# Patient Record
Sex: MALE | Race: WHITE | NOT HISPANIC OR LATINO | Employment: FULL TIME | URBAN - METROPOLITAN AREA
[De-identification: names, ages, dates, MRNs, and addresses within clinical notes are randomized per-mention and may not be internally consistent; named-entity substitution may affect disease eponyms.]

---

## 2017-02-07 ENCOUNTER — APPOINTMENT (EMERGENCY)
Dept: RADIOLOGY | Facility: HOSPITAL | Age: 27
End: 2017-02-07
Payer: COMMERCIAL

## 2017-02-07 ENCOUNTER — HOSPITAL ENCOUNTER (EMERGENCY)
Facility: HOSPITAL | Age: 27
Discharge: HOME | End: 2017-02-08
Admitting: EMERGENCY MEDICINE
Payer: COMMERCIAL

## 2017-02-07 ENCOUNTER — GENERIC CONVERSION - ENCOUNTER (OUTPATIENT)
Dept: OTHER | Facility: OTHER | Age: 27
End: 2017-02-07
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
## 2017-02-07 PROBLEM — M79.642 LEFT HAND PAIN: Status: ACTIVE | Noted: 2017-02-07

## 2017-02-07 PROBLEM — E16.2 HYPOGLYCEMIA: Status: ACTIVE | Noted: 2017-02-07

## 2017-02-07 PROBLEM — S20.219A CHEST WALL CONTUSION: Status: ACTIVE | Noted: 2017-02-07

## 2017-02-07 LAB
BASE EXCESS BLDA CALC-SCNC: 3 MMOL/L (ref -2–3)
CA-I BLD-SCNC: 1.1 MMOL/L (ref 1.12–1.32)
GLUCOSE SERPL-MCNC: 85 MG/DL (ref 65–140)
HCO3 BLDA-SCNC: 29.1 MMOL/L (ref 24–30)
HCT VFR BLD CALC: 40 % (ref 36.5–49.3)
HGB BLDA-MCNC: 13.6 G/DL (ref 12–17)
PCO2 BLD: 31 MMOL/L (ref 21–32)
PCO2 BLD: 50.8 MM HG (ref 42–50)
PH BLD: 7.37 [PH] (ref 7.3–7.4)
PO2 BLD: 20 MM HG (ref 35–45)
POTASSIUM BLD-SCNC: 3.4 MMOL/L (ref 3.5–5.3)
SAO2 % BLD FROM PO2: 30 % (ref 95–98)
SODIUM BLD-SCNC: 139 MMOL/L (ref 136–145)
SPECIMEN SOURCE: ABNORMAL

## 2017-02-07 PROCEDURE — 82947 ASSAY GLUCOSE BLOOD QUANT: CPT

## 2017-02-07 PROCEDURE — 71010 HB CHEST X-RAY 1 VIEW FRONTAL: CPT

## 2017-02-07 PROCEDURE — 82330 ASSAY OF CALCIUM: CPT

## 2017-02-07 PROCEDURE — 93005 ELECTROCARDIOGRAM TRACING: CPT | Performed by: EMERGENCY MEDICINE

## 2017-02-07 PROCEDURE — 73120 X-RAY EXAM OF HAND: CPT

## 2017-02-07 PROCEDURE — 36415 COLL VENOUS BLD VENIPUNCTURE: CPT | Performed by: FAMILY MEDICINE

## 2017-02-07 PROCEDURE — 70450 CT HEAD/BRAIN W/O DYE: CPT

## 2017-02-07 PROCEDURE — 85014 HEMATOCRIT: CPT

## 2017-02-07 PROCEDURE — 82803 BLOOD GASES ANY COMBINATION: CPT

## 2017-02-07 PROCEDURE — 90715 TDAP VACCINE 7 YRS/> IM: CPT | Performed by: EMERGENCY MEDICINE

## 2017-02-07 PROCEDURE — 84132 ASSAY OF SERUM POTASSIUM: CPT

## 2017-02-07 PROCEDURE — 84295 ASSAY OF SERUM SODIUM: CPT

## 2017-02-07 PROCEDURE — 96374 THER/PROPH/DIAG INJ IV PUSH: CPT

## 2017-02-07 RX ORDER — IBUPROFEN 600 MG/1
600 TABLET ORAL ONCE
Status: COMPLETED | OUTPATIENT
Start: 2017-02-08 | End: 2017-02-08

## 2017-02-07 RX ORDER — DEXTROSE MONOHYDRATE 25 G/50ML
25 INJECTION, SOLUTION INTRAVENOUS ONCE
Status: DISCONTINUED | OUTPATIENT
Start: 2017-02-07 | End: 2017-02-07

## 2017-02-07 RX ORDER — SODIUM CHLORIDE 9 MG/ML
INJECTION, SOLUTION INTRAVENOUS
Status: COMPLETED | OUTPATIENT
Start: 2017-02-07 | End: 2017-02-07

## 2017-02-07 RX ORDER — DEXTROSE MONOHYDRATE 25 G/50ML
INJECTION, SOLUTION INTRAVENOUS CODE/TRAUMA/SEDATION MEDICATION
Status: COMPLETED | OUTPATIENT
Start: 2017-02-07 | End: 2017-02-07

## 2017-02-07 RX ORDER — ONDANSETRON 2 MG/ML
4 INJECTION INTRAMUSCULAR; INTRAVENOUS ONCE
Status: COMPLETED | OUTPATIENT
Start: 2017-02-08 | End: 2017-02-08

## 2017-02-07 RX ORDER — INSULIN GLARGINE 100 [IU]/ML
26 INJECTION, SOLUTION SUBCUTANEOUS
COMMUNITY
End: 2017-05-30

## 2017-02-07 RX ADMIN — DEXTROSE MONOHYDRATE 25 ML: 500 INJECTION PARENTERAL at 22:49

## 2017-02-07 RX ADMIN — Medication 0.5 MG: at 23:25

## 2017-02-07 RX ADMIN — TETANUS TOXOID, REDUCED DIPHTHERIA TOXOID AND ACELLULAR PERTUSSIS VACCINE, ADSORBED 0.5 ML: 5; 2.5; 8; 8; 2.5 SUSPENSION INTRAMUSCULAR at 22:47

## 2017-02-07 RX ADMIN — IPRATROPIUM BROMIDE 0.5 MG: 0.5 SOLUTION RESPIRATORY (INHALATION) at 23:25

## 2017-02-07 RX ADMIN — SODIUM CHLORIDE 500 ML/HR: 0.9 INJECTION, SOLUTION INTRAVENOUS at 22:50

## 2017-02-07 RX ADMIN — ALBUTEROL SULFATE 5 MG: 2.5 SOLUTION RESPIRATORY (INHALATION) at 23:25

## 2017-02-08 ENCOUNTER — APPOINTMENT (EMERGENCY)
Dept: RADIOLOGY | Facility: HOSPITAL | Age: 27
End: 2017-02-08
Payer: COMMERCIAL

## 2017-02-08 VITALS
HEART RATE: 65 BPM | DIASTOLIC BLOOD PRESSURE: 68 MMHG | RESPIRATION RATE: 16 BRPM | TEMPERATURE: 98 F | OXYGEN SATURATION: 95 % | SYSTOLIC BLOOD PRESSURE: 130 MMHG

## 2017-02-08 LAB
ATRIAL RATE: 67 BPM
GLUCOSE SERPL-MCNC: 188 MG/DL (ref 65–140)
GLUCOSE SERPL-MCNC: 59 MG/DL (ref 65–140)
HOLD SPECIMEN: NORMAL
P AXIS: 31 DEGREES
PR INTERVAL: 124 MS
QRS AXIS: 89 DEGREES
QRSD INTERVAL: 90 MS
QT INTERVAL: 386 MS
QTC INTERVAL: 407 MS
T WAVE AXIS: 21 DEGREES
VENTRICULAR RATE: 67 BPM

## 2017-02-08 PROCEDURE — 73140 X-RAY EXAM OF FINGER(S): CPT

## 2017-02-08 PROCEDURE — 94640 AIRWAY INHALATION TREATMENT: CPT

## 2017-02-08 PROCEDURE — 96375 TX/PRO/DX INJ NEW DRUG ADDON: CPT

## 2017-02-08 PROCEDURE — 82948 REAGENT STRIP/BLOOD GLUCOSE: CPT

## 2017-02-08 PROCEDURE — 90471 IMMUNIZATION ADMIN: CPT

## 2017-02-08 PROCEDURE — 96376 TX/PRO/DX INJ SAME DRUG ADON: CPT

## 2017-02-08 PROCEDURE — 99285 EMERGENCY DEPT VISIT HI MDM: CPT

## 2017-02-08 RX ORDER — DEXTROSE MONOHYDRATE 25 G/50ML
25 INJECTION, SOLUTION INTRAVENOUS ONCE
Status: COMPLETED | OUTPATIENT
Start: 2017-02-08 | End: 2017-02-08

## 2017-02-08 RX ADMIN — ONDANSETRON 4 MG: 2 INJECTION INTRAMUSCULAR; INTRAVENOUS at 00:01

## 2017-02-08 RX ADMIN — DEXTROSE MONOHYDRATE 25 ML: 25 INJECTION, SOLUTION INTRAVENOUS at 00:39

## 2017-02-08 RX ADMIN — IBUPROFEN 600 MG: 600 TABLET ORAL at 00:26

## 2017-02-09 LAB — HOLD SPECIMEN: NORMAL

## 2017-05-30 ENCOUNTER — HOSPITAL ENCOUNTER (EMERGENCY)
Facility: HOSPITAL | Age: 27
Discharge: HOME | End: 2017-05-30
Attending: EMERGENCY MEDICINE | Admitting: EMERGENCY MEDICINE
Payer: COMMERCIAL

## 2017-05-30 VITALS
TEMPERATURE: 97.8 F | DIASTOLIC BLOOD PRESSURE: 59 MMHG | OXYGEN SATURATION: 97 % | HEART RATE: 71 BPM | RESPIRATION RATE: 16 BRPM | SYSTOLIC BLOOD PRESSURE: 129 MMHG

## 2017-05-30 DIAGNOSIS — R79.89 CREATININE ELEVATION: ICD-10-CM

## 2017-05-30 DIAGNOSIS — E16.2 HYPOGLYCEMIA: Primary | ICD-10-CM

## 2017-05-30 LAB
ANION GAP BLD CALC-SCNC: 19 MMOL/L (ref 4–13)
ANION GAP SERPL CALCULATED.3IONS-SCNC: 8 MMOL/L (ref 4–13)
BUN BLD-MCNC: 10 MG/DL (ref 5–25)
BUN SERPL-MCNC: 10 MG/DL (ref 5–25)
CA-I BLD-SCNC: 1.12 MMOL/L (ref 1.12–1.32)
CALCIUM SERPL-MCNC: 8.5 MG/DL (ref 8.3–10.1)
CHLORIDE BLD-SCNC: 97 MMOL/L (ref 100–108)
CHLORIDE SERPL-SCNC: 106 MMOL/L (ref 100–108)
CO2 SERPL-SCNC: 28 MMOL/L (ref 21–32)
CREAT BLD-MCNC: 1.4 MG/DL (ref 0.6–1.3)
CREAT SERPL-MCNC: 1.27 MG/DL (ref 0.6–1.3)
GFR SERPL CREATININE-BSD FRML MDRD: >60 ML/MIN/1.73SQ M
GFR SERPL CREATININE-BSD FRML MDRD: >60 ML/MIN/1.73SQ M
GLUCOSE SERPL-MCNC: 136 MG/DL (ref 65–140)
GLUCOSE SERPL-MCNC: 170 MG/DL (ref 65–140)
GLUCOSE SERPL-MCNC: 64 MG/DL (ref 65–140)
HCT VFR BLD CALC: 48 % (ref 36.5–49.3)
HGB BLDA-MCNC: 16.3 G/DL (ref 12–17)
PCO2 BLD: 27 MMOL/L (ref 21–32)
POTASSIUM BLD-SCNC: 3.2 MMOL/L (ref 3.5–5.3)
POTASSIUM SERPL-SCNC: 3.6 MMOL/L (ref 3.5–5.3)
SODIUM BLD-SCNC: 139 MMOL/L (ref 136–145)
SODIUM SERPL-SCNC: 142 MMOL/L (ref 136–145)
SPECIMEN SOURCE: ABNORMAL

## 2017-05-30 PROCEDURE — 85014 HEMATOCRIT: CPT

## 2017-05-30 PROCEDURE — 99285 EMERGENCY DEPT VISIT HI MDM: CPT

## 2017-05-30 PROCEDURE — 82948 REAGENT STRIP/BLOOD GLUCOSE: CPT

## 2017-05-30 PROCEDURE — 96360 HYDRATION IV INFUSION INIT: CPT

## 2017-05-30 PROCEDURE — 36415 COLL VENOUS BLD VENIPUNCTURE: CPT | Performed by: EMERGENCY MEDICINE

## 2017-05-30 PROCEDURE — 80048 BASIC METABOLIC PNL TOTAL CA: CPT | Performed by: EMERGENCY MEDICINE

## 2017-05-30 PROCEDURE — 80047 BASIC METABLC PNL IONIZED CA: CPT

## 2017-05-30 RX ORDER — DEXTROSE MONOHYDRATE 25 G/50ML
INJECTION, SOLUTION INTRAVENOUS
Status: COMPLETED
Start: 2017-05-30 | End: 2017-05-30

## 2017-05-30 RX ADMIN — SODIUM CHLORIDE 1000 ML: 0.9 INJECTION, SOLUTION INTRAVENOUS at 19:00
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
## 2021-01-06 ENCOUNTER — TELEMEDICINE (OUTPATIENT)
Dept: FAMILY MEDICINE CLINIC | Facility: CLINIC | Age: 31
End: 2021-01-06
Payer: COMMERCIAL

## 2021-01-06 VITALS — WEIGHT: 162.5 LBS | HEIGHT: 66 IN | BODY MASS INDEX: 26.12 KG/M2

## 2021-01-06 DIAGNOSIS — E10.9 TYPE 1 DIABETES MELLITUS WITHOUT COMPLICATION (HCC): Primary | ICD-10-CM

## 2021-01-06 DIAGNOSIS — F33.1 DEPRESSION OF INFANCY TO EARLY CHILDHOOD, MAJOR DEPRESSION, RECURRENT, MODERATE EPISODE (HCC): ICD-10-CM

## 2021-01-06 DIAGNOSIS — F33.1 MODERATE EPISODE OF RECURRENT MAJOR DEPRESSIVE DISORDER (HCC): ICD-10-CM

## 2021-01-06 PROCEDURE — 99203 OFFICE O/P NEW LOW 30 MIN: CPT | Performed by: FAMILY MEDICINE

## 2021-01-06 NOTE — PROGRESS NOTES
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, major depression, recurrent, moderate episode (HCC)        Moderate episode of recurrent major depressive disorder ([facility] )        Relevant Orders    Ambulatory referral to Psychiatry          BMI Counseling: Body mass index is 26 23 kg/m²  The BMI is above normal  Nutrition recommendations include decreasing portion sizes, encouraging healthy choices of fruits and vegetables, decreasing fast food intake, consuming healthier snacks, limiting drinks that contain sugar, moderation in carbohydrate intake and reducing intake of cholesterol  Exercise recommendations include moderate physical activity 150 minutes/week  No pharmacotherapy was ordered  Labs and referrals as above, discussed importance of medical compliance  Denies si/hi  F/u for annual physical  PHQ-9 Depression Screening    PHQ-9:   Frequency of the following problems over the past two weeks:      Little interest or pleasure in doing things: 2 - more than half the days  Feeling down, depressed, or hopeless: 2 - more than half the days  Trouble falling or staying asleep, or sleeping too much: 3 - nearly every day  Feeling tired or having little energy: 1 - several days  Poor appetite or overeatin - not at all  Feeling bad about yourself - or that you are a failure or have let yourself or your family down: 1 - several days  Trouble concentrating on things, such as reading the newspaper or watching television: 3 - nearly every day  Moving or speaking so slowly that other people could have noticed   Or the opposite - being so fidgety or restless that you have been moving around a lot more than usual: 1 - several days  Thoughts that you would be better off dead, or of hurting yourself in some way: 0 - not at all  PHQ-2 Score: 4  PHQ-9 Score: 13         Reason for visit is   Chief Complaint   Patient presents with    Virtual Brief Visit     needs a referral for pysch    Virtual Regular Visit        Encounter provider Myra Pelaez MD    Provider located at [address] [place] [address]  [address] [address] [phone]      Recent Visits  No visits were found meeting these conditions  Showing recent visits within past 7 days and meeting all other requirements     Today's Visits  Date Type Provider Dept   01/06/21 Telemedicine Myra Pelaez MD [address] [facility] today's visits and meeting all other requirements     Future Appointments  No visits were found meeting these conditions  Showing future appointments within next 150 days and meeting all other requirements        The patient was identified by name and date of birth  Luis Carlos Jeong was informed that this is a telemedicine visit and that the visit is being conducted through [address] [place] [address] and patient was informed that this is not a secure, HIPAA-compliant platform  He agrees to proceed     My office door was closed  No one else was in the room  He acknowledged consent and understanding of privacy and security of the video platform  The patient has agreed to participate and understands they can discontinue the visit at any time  Patient is aware this is a billable service  Marga Watts is a 31 y o  male     Patient weighed the co-pay For virtual visit to establish care:with new onset depression, was in remission for 15 years , type 1 diabetes- not seen a physician in 3 years , last seen 2017, control unknown, uses sliding scale insulin from [facility]   Has not seen ophthalm in 2 years, has strong family history of diabetes and members with blindness and requiring renal transplant  Past Medical History:   Diagnosis Date    Diabetes mellitus ([facility] )        History reviewed  No pertinent surgical history  Current Outpatient Medications   Medication Sig Dispense Refill    insulin regular (HumuLIN R,NovoLIN R) 100 units/mL injection Inject under the skin 3 (three) times a day before meals       No current facility-administered medications for this visit  No Known Allergies    Review of Systems   Constitutional: Negative for activity change, chills, diaphoresis, fatigue and fever  HENT: Negative for congestion, ear discharge, ear pain, mouth sores, nosebleeds, postnasal drip, rhinorrhea, sinus pressure, sinus pain, sore throat, tinnitus and voice change  Eyes: Negative for photophobia, pain, discharge, redness and visual disturbance  Respiratory: Negative for shortness of breath, wheezing and stridor  Cardiovascular: Negative for chest pain and palpitations  Gastrointestinal: Negative for abdominal pain, blood in stool, constipation, diarrhea, nausea and vomiting  Endocrine: Negative for cold intolerance and heat intolerance  Musculoskeletal: Negative for arthralgias, back pain, joint swelling and myalgias  Skin: Negative for pallor and rash  Neurological: Negative for dizziness, tremors, seizures, syncope, speech difficulty, weakness and headaches  Psychiatric/Behavioral: Negative for behavioral problems, decreased concentration, hallucinations, self-injury and suicidal ideas  The patient is not nervous/anxious  Depressed mood       Video Exam    Vitals:    01/06/21 0938   Weight: 73 7 kg (162 lb 8 oz)   Height: 5' 6" (1 676 m)       Physical Exam  Vitals signs and nursing note reviewed  Constitutional:       Appearance: He is well-developed  He is obese  HENT:      Head: Normocephalic and atraumatic        Right Ear: External ear normal       Left Ear: External ear normal    Eyes:      Conjunctiva/sclera: Conjunctivae normal    Neck:      Musculoskeletal: Normal range of motion  No neck rigidity or muscular tenderness  Pulmonary:      Effort: Pulmonary effort is normal  No respiratory distress  Abdominal:      Palpations: Abdomen is soft  Tenderness: There is no abdominal tenderness  Comments: Patient self-examined   Musculoskeletal:         General: No tenderness, deformity or signs of injury  Skin:     Coloration: Skin is not jaundiced or pale  Findings: No erythema  Neurological:      Mental Status: He is alert and oriented to person, place, and time  Mental status is at baseline  Psychiatric:         Mood and Affect: Mood is depressed  Behavior: Behavior normal          Thought Content: Thought content normal               VIRTUAL VISIT DISCLAIMER    Garrick Marjoriejunior acknowledges that he has consented to an online visit or consultation  He understands that the online visit is based solely on information provided by him, and that, in the absence of a face-to-face physical evaluation by the physician, the diagnosis he receives is both limited and provisional in terms of accuracy and completeness  This is not intended to replace a full medical face-to-face evaluation by the physician  Garrick Webb understands and accepts these terms

## 2021-01-13 ENCOUNTER — TELEPHONE (OUTPATIENT)
Dept: PSYCHIATRY | Facility: CLINIC | Age: 31
End: 2021-01-13

## 2021-01-13 NOTE — TELEPHONE ENCOUNTER
Behavorial Health Outpatient Intake Questions    Referred by: PCP    Please advised interviewee that they need to answer all questions truthfully to allow for best care and any misrepresentations of information may affect their ability to be seen at this clinic   => Was this discussed? Yes     Behavorial Health Outpatient Intake History -     Presenting Problem (in patient's words): Has been experiencing depression his whole life but states that within the last year it has gotten worse  Are there any developmental disabilities? ? If yes, can they speak to you on the phone? If they are too limited to speak to you on phone, refer out Yes ADHD    Are you taking any psychiatric medications? No    => If yes, who prescribes? If yes, are they injectable medications? Does the patient have a language barrier or hearing impairment? No    Have you been [facility] at [facility] by a therapist or a doctor in the past? If yes, who? No    Has the patient been hospitalized for mental health? No   If yes, how long ago was last hospitalization and where was it? Do you actively use alcohol or marijuana or illegal substances? If yes, what and how much - refer out to Drug and alcohol treatment if use is excessive or daily use of illegal substances There is a documented history of alcohol abuse confirmed by the patient  10 years ago    Do you have a community treatment team or ? No    Legal History-     Does the patient have any history of arrests, [facility] time, or DUIs? No  If Yes-  1) What types of charges? 2) When were they last incarcerated? 3) Are they currently on parole or probation? Minor Child-    Who has custody of the child? Is there a custody agreement? If there is a custody agreement remind parent that they must bring a copy to the first appt or they will not be seen       Intake Team, please check with provider before scheduling if flags come up such as:  - complex case  - legal history (other than DUI)  - communication barrier concerns are present  - if, in your judgment, this needs further review    ACCEPTED as a patient Yes  => Appointment Date: 02/24/2021 w/ Zachary Bee    Referred Elsewhere? No    Name of Insurance Co: [address] ID# [id number]  Insurance Phone #  If ins is primary or secondary  If patient is a minor, parents information such as Name, D  O B of guarantor

## 2021-01-20 ENCOUNTER — LAB (OUTPATIENT)
Dept: LAB | Facility: CLINIC | Age: 31
End: 2021-01-20
Payer: COMMERCIAL

## 2021-01-20 DIAGNOSIS — E10.9 TYPE 1 DIABETES MELLITUS WITHOUT COMPLICATION (HCC): ICD-10-CM

## 2021-01-20 LAB
ALBUMIN SERPL BCP-MCNC: 3.6 G/DL (ref 3.5–5)
ALP SERPL-CCNC: 124 U/L (ref 46–116)
ALT SERPL W P-5'-P-CCNC: 28 U/L (ref 12–78)
ANION GAP SERPL CALCULATED.3IONS-SCNC: 4 MMOL/L (ref 4–13)
AST SERPL W P-5'-P-CCNC: 26 U/L (ref 5–45)
BASOPHILS # BLD AUTO: 0.04 THOUSANDS/ΜL (ref 0–0.1)
BASOPHILS NFR BLD AUTO: 1 % (ref 0–1)
BILIRUB SERPL-MCNC: 0.76 MG/DL (ref 0.2–1)
BUN SERPL-MCNC: 12 MG/DL (ref 5–25)
CALCIUM SERPL-MCNC: 9.1 MG/DL (ref 8.3–10.1)
CHLORIDE SERPL-SCNC: 105 MMOL/L (ref 100–108)
CHOLEST SERPL-MCNC: 214 MG/DL (ref 50–200)
CO2 SERPL-SCNC: 27 MMOL/L (ref 21–32)
CREAT SERPL-MCNC: 1.15 MG/DL (ref 0.6–1.3)
CREAT UR-MCNC: 84.2 MG/DL
EOSINOPHIL # BLD AUTO: 0.27 THOUSAND/ΜL (ref 0–0.61)
EOSINOPHIL NFR BLD AUTO: 5 % (ref 0–6)
ERYTHROCYTE [DISTWIDTH] IN BLOOD BY AUTOMATED COUNT: 13.8 % (ref 11.6–15.1)
EST. AVERAGE GLUCOSE BLD GHB EST-MCNC: 171 MG/DL
GFR SERPL CREATININE-BSD FRML MDRD: 84 ML/MIN/1.73SQ M
GLUCOSE P FAST SERPL-MCNC: 300 MG/DL (ref 65–99)
HBA1C MFR BLD: 7.6 %
HCT VFR BLD AUTO: 42.5 % (ref 36.5–49.3)
HDLC SERPL-MCNC: 47 MG/DL
HGB BLD-MCNC: 13.7 G/DL (ref 12–17)
IMM GRANULOCYTES # BLD AUTO: 0.01 THOUSAND/UL (ref 0–0.2)
IMM GRANULOCYTES NFR BLD AUTO: 0 % (ref 0–2)
LDLC SERPL CALC-MCNC: 145 MG/DL (ref 0–100)
LYMPHOCYTES # BLD AUTO: 1.25 THOUSANDS/ΜL (ref 0.6–4.47)
LYMPHOCYTES NFR BLD AUTO: 21 % (ref 14–44)
MCH RBC QN AUTO: 29.8 PG (ref 26.8–34.3)
MCHC RBC AUTO-ENTMCNC: 32.2 G/DL (ref 31.4–37.4)
MCV RBC AUTO: 93 FL (ref 82–98)
MICROALBUMIN UR-MCNC: <5 MG/L (ref 0–20)
MICROALBUMIN/CREAT 24H UR: <6 MG/G CREATININE (ref 0–30)
MONOCYTES # BLD AUTO: 0.29 THOUSAND/ΜL (ref 0.17–1.22)
MONOCYTES NFR BLD AUTO: 5 % (ref 4–12)
NEUTROPHILS # BLD AUTO: 4.03 THOUSANDS/ΜL (ref 1.85–7.62)
NEUTS SEG NFR BLD AUTO: 68 % (ref 43–75)
NONHDLC SERPL-MCNC: 167 MG/DL
NRBC BLD AUTO-RTO: 0 /100 WBCS
PLATELET # BLD AUTO: 335 THOUSANDS/UL (ref 149–390)
PMV BLD AUTO: 10.1 FL (ref 8.9–12.7)
POTASSIUM SERPL-SCNC: 4.5 MMOL/L (ref 3.5–5.3)
PROT SERPL-MCNC: 7 G/DL (ref 6.4–8.2)
RBC # BLD AUTO: 4.59 MILLION/UL (ref 3.88–5.62)
SODIUM SERPL-SCNC: 136 MMOL/L (ref 136–145)
TRIGL SERPL-MCNC: 111 MG/DL
WBC # BLD AUTO: 5.89 THOUSAND/UL (ref 4.31–10.16)

## 2021-01-20 PROCEDURE — 83036 HEMOGLOBIN GLYCOSYLATED A1C: CPT

## 2021-01-20 PROCEDURE — 80053 COMPREHEN METABOLIC PANEL: CPT

## 2021-01-20 PROCEDURE — 36415 COLL VENOUS BLD VENIPUNCTURE: CPT

## 2021-01-20 PROCEDURE — 85025 COMPLETE CBC W/AUTO DIFF WBC: CPT

## 2021-01-20 PROCEDURE — 3051F HG A1C>EQUAL 7.0%<8.0%: CPT | Performed by: FAMILY MEDICINE

## 2021-01-20 PROCEDURE — 82570 ASSAY OF URINE CREATININE: CPT

## 2021-01-20 PROCEDURE — 80061 LIPID PANEL: CPT

## 2021-01-20 PROCEDURE — 82043 UR ALBUMIN QUANTITATIVE: CPT

## 2021-01-25 ENCOUNTER — TELEMEDICINE (OUTPATIENT)
Dept: FAMILY MEDICINE CLINIC | Facility: CLINIC | Age: 31
End: 2021-01-25
Payer: COMMERCIAL

## 2021-01-25 VITALS — BODY MASS INDEX: 28.93 KG/M2 | HEIGHT: 66 IN | WEIGHT: 180 LBS

## 2021-01-25 DIAGNOSIS — E78.2 MIXED HYPERLIPIDEMIA: Primary | ICD-10-CM

## 2021-01-25 DIAGNOSIS — E10.65 TYPE 1 DIABETES MELLITUS WITH HYPERGLYCEMIA (HCC): ICD-10-CM

## 2021-01-25 PROCEDURE — 1036F TOBACCO NON-USER: CPT | Performed by: FAMILY MEDICINE

## 2021-01-25 PROCEDURE — 3008F BODY MASS INDEX DOCD: CPT | Performed by: FAMILY MEDICINE

## 2021-01-25 PROCEDURE — 3725F SCREEN DEPRESSION PERFORMED: CPT | Performed by: FAMILY MEDICINE

## 2021-01-25 PROCEDURE — 99213 OFFICE O/P EST LOW 20 MIN: CPT | Performed by: FAMILY MEDICINE

## 2021-01-25 RX ORDER — ATORVASTATIN CALCIUM 10 MG/1
10 TABLET, FILM COATED ORAL DAILY
Qty: 30 TABLET | Refills: 2 | Status: SHIPPED | OUTPATIENT
Start: 2021-01-25

## 2021-01-25 NOTE — PROGRESS NOTES
Virtual Regular Visit      Assessment/Plan:    Problem List Items Addressed This Visit        Endocrine    Type 1 diabetes mellitus with hyperglycemia ([address] )       Other    Hyperlipidemia - Primary    Relevant Medications    atorvastatin (LIPITOR) 10 mg tablet        Discussed to start atorvastatin 10 mg , goal LDL<100 (140 CURRENTLY)  Increase Novolin N TO 12 u BEFORE breakfast and Novolin R sliding scale by 2 u based on his sliding scale  Current A1C- is 7 6, NEEDS tighter control goal A1C-7 0, discussed carb counting  He will follow up for annual physical in 2 months    Tobacco Cessation Counseling: Tobacco cessation counseling was not provided  The patient is sincerely urged to quit consumption of tobacco  He is not ready to quit tobacco        Reason for visit is   Chief Complaint   Patient presents with    Results     blood test results    Virtual Regular Visit        Encounter provider Fabiola Portillo MD    Provider located at [address] [place] [id number] [phone]      Recent Visits  No visits were found meeting these conditions  Showing recent visits within past 7 days and meeting all other requirements     Today's Visits  Date Type Provider Dept   01/25/21 Telemedicine Fabiola Portillo MD  [address] [facility] [facility] [address] today's visits and meeting all other requirements     Future Appointments  No visits were found meeting these conditions  Showing future appointments within next 150 days and meeting all other requirements        The patient was identified by name and date of birth  Glenny Dailey was informed that this is a telemedicine visit and that the visit is being conducted through [facility] and patient was informed that this is a secure, HIPAA-compliant platform  He agrees to proceed     My office door was closed  No one else was in the room    He acknowledged consent and understanding of privacy and security of the video platform  The patient has agreed to participate and understands they can discontinue the visit at any time  Patient is aware this is a billable service  Subjective  Tiana Zimmerman is a 31 y o  male       Type 1 diabetes- uncontrolled, chronic, needs to discuss labs  Hyperlipidemia-on labs, needs to discuss treatment       Past Medical History:   Diagnosis Date    Diabetes mellitus ([facility] )        History reviewed  No pertinent surgical history  Current Outpatient Medications   Medication Sig Dispense Refill    insulin regular (HumuLIN R,NovoLIN R) 100 units/mL injection Inject under the skin 3 (three) times a day before meals      atorvastatin (LIPITOR) 10 mg tablet Take 1 tablet (10 mg total) by mouth daily 30 tablet 2     No current facility-administered medications for this visit  No Known Allergies    Review of Systems   Constitutional: Negative for activity change, chills, diaphoresis, fatigue and fever  HENT: Negative for congestion, ear discharge, ear pain, mouth sores, nosebleeds, postnasal drip, rhinorrhea, sinus pressure, sinus pain, sore throat, tinnitus and voice change  Eyes: Negative for photophobia, pain, discharge, redness and visual disturbance  Respiratory: Negative for shortness of breath, wheezing and stridor  Cardiovascular: Negative for chest pain and palpitations  Gastrointestinal: Negative for abdominal pain, blood in stool, constipation, diarrhea, nausea and vomiting  Endocrine: Negative for cold intolerance and heat intolerance  Musculoskeletal: Negative for arthralgias, back pain, joint swelling and myalgias  Skin: Negative for pallor and rash  Neurological: Negative for dizziness, tremors, seizures, syncope, speech difficulty, weakness and headaches  Psychiatric/Behavioral: Negative for behavioral problems, decreased concentration, hallucinations and suicidal ideas  The patient is not nervous/anxious          Video Exam    Vitals: 01/25/21 0840   Weight: 81 6 kg (180 lb)   Height: 5' 6" (1 676 m)       Physical Exam  Vitals signs and nursing note reviewed  Constitutional:       Appearance: He is well-developed  HENT:      Head: Normocephalic and atraumatic  Right Ear: External ear normal       Left Ear: External ear normal    Eyes:      Conjunctiva/sclera: Conjunctivae normal    Neck:      Musculoskeletal: Normal range of motion  No neck rigidity or muscular tenderness  Pulmonary:      Effort: Pulmonary effort is normal  No respiratory distress  Abdominal:      Palpations: Abdomen is soft  Tenderness: There is no abdominal tenderness  Comments: Patient self-examined   Musculoskeletal:         General: No tenderness, deformity or signs of injury  Skin:     Coloration: Skin is not jaundiced or pale  Findings: No erythema  Neurological:      Mental Status: He is alert and oriented to person, place, and time  Mental status is at baseline  Psychiatric:         Behavior: Behavior normal           I spent 15 minutes directly with the patient during this visit      VIRTUAL VISIT DISCLAIMER    Norma Mancia acknowledges that he has consented to an online visit or consultation  He understands that the online visit is based solely on information provided by him, and that, in the absence of a face-to-face physical evaluation by the physician, the diagnosis he receives is both limited and provisional in terms of accuracy and completeness  This is not intended to replace a full medical face-to-face evaluation by the physician  Norma Mancia understands and accepts these terms

## 2021-03-08 ENCOUNTER — HOSPITAL ENCOUNTER (EMERGENCY)
Facility: HOSPITAL | Age: 31
Discharge: HOME | End: 2021-03-08
Attending: EMERGENCY MEDICINE | Admitting: EMERGENCY MEDICINE
Payer: COMMERCIAL

## 2021-03-08 VITALS
BODY MASS INDEX: 27 KG/M2 | HEIGHT: 66 IN | DIASTOLIC BLOOD PRESSURE: 68 MMHG | HEART RATE: 63 BPM | SYSTOLIC BLOOD PRESSURE: 117 MMHG | WEIGHT: 168 LBS | RESPIRATION RATE: 16 BRPM | TEMPERATURE: 97.4 F | OXYGEN SATURATION: 97 %

## 2021-03-08 DIAGNOSIS — K05.30 PERICORONITIS: Primary | ICD-10-CM

## 2021-03-08 PROCEDURE — 96372 THER/PROPH/DIAG INJ SC/IM: CPT

## 2021-03-08 PROCEDURE — 99284 EMERGENCY DEPT VISIT MOD MDM: CPT | Performed by: EMERGENCY MEDICINE

## 2021-03-08 PROCEDURE — 99283 EMERGENCY DEPT VISIT LOW MDM: CPT

## 2021-03-08 RX ORDER — KETOROLAC TROMETHAMINE 30 MG/ML
15 INJECTION, SOLUTION INTRAMUSCULAR; INTRAVENOUS ONCE
Status: DISCONTINUED | OUTPATIENT
Start: 2021-03-08 | End: 2021-03-08

## 2021-03-08 RX ORDER — PENICILLIN V POTASSIUM 500 MG/1
500 TABLET ORAL 3 TIMES DAILY
Qty: 15 TABLET | Refills: 0 | Status: SHIPPED | OUTPATIENT
Start: 2021-03-08 | End: 2021-03-13

## 2021-03-08 RX ORDER — PENICILLIN V POTASSIUM 250 MG/1
500 TABLET ORAL ONCE
Status: COMPLETED | OUTPATIENT
Start: 2021-03-08 | End: 2021-03-08

## 2021-03-08 RX ORDER — KETOROLAC TROMETHAMINE 30 MG/ML
15 INJECTION, SOLUTION INTRAMUSCULAR; INTRAVENOUS ONCE
Status: COMPLETED | OUTPATIENT
Start: 2021-03-08 | End: 2021-03-08

## 2021-03-08 RX ADMIN — PENICILLIN V POTASSIUM 500 MG: 250 TABLET, FILM COATED ORAL at 08:46

## 2021-03-08 RX ADMIN — KETOROLAC TROMETHAMINE 15 MG: 30 INJECTION, SOLUTION INTRAMUSCULAR at 08:46
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
## 2021-03-08 NOTE — ED NOTES
Dr Jayne Back at bedside for patient evaluation, mother at bedside        Unknown Tyson, RN  03/08/21 8385

## 2021-03-08 NOTE — DISCHARGE INSTRUCTIONS
Take 250 mg naproxen twice daily for the next 5 days  Stop taking the Tylenol  Start taking penicillin 3 times daily for the next 5 days  Follow up with the dental care clinic as discussed as soon as possible

## 2021-03-08 NOTE — Clinical Note
Adonis Mosqueda was seen and treated in our emergency department on 3/8/2021             none    Diagnosis:     Jesse Steen  may return to work on return date  He may return on this date: 03/09/2021         If you have any questions or concerns, please don't hesitate to call        Katerina Terry DO    ______________________________           _______________          _______________  Hospital Representative                              Date                                Time
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
## 2021-04-06 ENCOUNTER — TELEPHONE (OUTPATIENT)
Dept: PSYCHIATRY | Facility: CLINIC | Age: 31
End: 2021-04-06

## 2021-04-06 ENCOUNTER — TELEMEDICINE (OUTPATIENT)
Dept: PSYCHIATRY | Facility: CLINIC | Age: 31
End: 2021-04-06
Payer: COMMERCIAL

## 2021-04-06 DIAGNOSIS — F17.200 NICOTINE USE DISORDER: ICD-10-CM

## 2021-04-06 DIAGNOSIS — F33.1 MODERATE EPISODE OF RECURRENT MAJOR DEPRESSIVE DISORDER (HCC): ICD-10-CM

## 2021-04-06 DIAGNOSIS — F32.A DEPRESSIVE DISORDER: ICD-10-CM

## 2021-04-06 DIAGNOSIS — F41.1 GENERALIZED ANXIETY DISORDER: Primary | ICD-10-CM

## 2021-04-06 DIAGNOSIS — F19.10 POLYSUBSTANCE ABUSE (HCC): ICD-10-CM

## 2021-04-06 PROCEDURE — 90792 PSYCH DIAG EVAL W/MED SRVCS: CPT | Performed by: STUDENT IN AN ORGANIZED HEALTH CARE EDUCATION/TRAINING PROGRAM

## 2021-04-06 RX ORDER — DULOXETIN HYDROCHLORIDE 30 MG/1
30 CAPSULE, DELAYED RELEASE ORAL DAILY
Qty: 30 CAPSULE | Refills: 1 | Status: SHIPPED | OUTPATIENT
Start: 2021-04-06 | End: 2021-04-28 | Stop reason: SDUPTHER

## 2021-04-06 NOTE — BH TREATMENT PLAN
TREATMENT PLAN (Medication Management Only)        [address] [place] [address]    Name and Date of Birth:  Josh Moreno 31 y o  1990  Date of Treatment Plan: April 6, 2021  Diagnosis/Diagnoses:    1  Generalized anxiety disorder    2  Depressive disorder, unspecified     3  Nicotine use disorder    4  Hisotry of polysubstance abuse ([address] ) - in sustained remission    5  Moderate episode of recurrent major depressive disorder [facility])      Strengths/Personal Resources for Self-Care: supportive family, supportive friends, ability to adapt to life changes, ability to communicate needs, ability to communicate well, ability to listen, ability to reason, general fund of knowledge, good physical health, good understanding of illness, motivation for treatment, ability to negotiate basic needs, being resoureceful, self-reliance, sense of humor, stable employment, willingness to work on problems  Area/Areas of need (in own words): anxiety symptoms, mood swings - " I want to stop feeling so anxious and on edge"  1  Long Term Goal: alleviate anxiety, limit irritability, improve functionality   Target Date:6 months - 10/6/2021  Person/Persons responsible for completion of goal: Osvaldo Lucero  2  Short Term Objective (s) - How will we reach this goal?:   A  Provider new recommended medication/dosage changes and/or continue medication(s): continue current medications as prescribed  B  Attend medication management appointments regularly  C  Take psychiatric medications responsibly  D  Avoid use of ETOH or illict drugs   E   Lose weight  F  Prioritize mental health  Target Date:6 months - 10/6/2021  Person/Persons Responsible for Completion of Goal: Osvaldo Lucero  Progress Towards Goals: starting treatment  Treatment Modality: medication management every 2 months  Review due 180 days from date of this plan: 6 months - 10/6/2021  Expected length of service: ongoing treatment  My Physician/PA/NP and I have developed this plan together and I agree to work on the goals and objectives  I understand the treatment goals that were developed for my treatment  Treatment Plan completed with assistance and input from patient and verbal consent provided  Treatment plan was not signed at time of office visit secondary to COVID-19 social distancing guidelines

## 2021-04-06 NOTE — PSYCH
[address]    Name and Date of Birth:  Nora Maldonado 31 y o  1990 MRN: [id number]    Date of Visit: April 6, 2021    Reason for visit: Full psychiatric intake assessment for medication management     Virtual Visit Disclaimer: The patient was identified by name and date of birth  Nora Maldonado was informed that this is a telemedicine visit and that the visit is being conducted through [employer] and patient was informed that this is a secure, HIPAA-compliant platform  He agrees to proceed  My office door was closed  No one else was in the room  He acknowledged consent and understanding of privacy and security of the video platform  The patient has agreed to participate and understands they can discontinue the visit at any time  Patient is aware this is a billable service  HPI     Nora Maldonado is a 31 y o  male with a past psychiatric history significant for depression, anxiety, alcohol use disorder (in remission), nicotine use disorder, and previous episodes of polysubstance abuse (cocaine, benzodiazapine's, opiates) who presents to the [address] [place] [address] outpatient clinic for intake assessment  Blessing Brush presents as pleasant, cooperative, and appropriately engaging  His thoughts are organized, linear, and goal-directed and he completes psychiatric assessment without difficulty  Blessing Brush was referred to the clinic secondary to worsening anxiety and episodic bouts of depression, that are worse in the winter months  Blessing Brush endorses a longstanding history of polysubstance abuse that likely contributed to worsening mood symptomatology 1 decade ago  Blessing Brush is pleased to report today that he has been sober from alcohol, "pills", and cocaine for many years  He continues to use nicotine daily and cannabis 1-2x per week   In addition to polysubstance abuse, Blessing Brush has an extensive history of head trauma secondary to years of fighting in [facility]  He states that he has suffered 3 concussions "just this year"  This also has most likely contributed to mood symptomatology and anxiety  Acutely, Wali Chi states that his main concern is managing his anxiety  He admits to daily worry, excessive nervousness, and constantly feeling "tense" and on-edge  When overtly anxious, he endorses profound irritability, anger outbursts, and limited frustration tolerance  During these times, he is able to use mindfulness exercises learned in [facility] to decompress  These episodes happen approximately 1-2x per week  Wali Chi admits to being easily annoyed and restless  He finds it difficult to relax and concentrate at times, secondary to anxious ruminations  He denies panic symptomatology or any physical manifestations of anxiety  DAVI-7 score obtained during today's visit was 8  Wali Chi currently denies neurovegetative symptomatology suggestive of major depressive disorder or dysthymia  He does endorse a seasonal pattern to his dysphoria which has improved over the last 1 month  Wali Chi denies fragmented or non-restorative sleep  Wali Chi endorses robust appetite, baseline energy, and no impairment of motivation  Wali Chi reports adequate concentration/memory and denies new-onset forgetfulness or inattentiveness, when not overtly anxious or perturbed  Wali Chi does not experience daily crying spells or limited pleasure in activities previously found pleasurable  Wali Chi adamantly denies acute thoughts of suicide or self-harm  Wali Chi has no plans to harm others  There is no documented history of prior suicidal attempts but Wali Chi does admit to a suicidal gesture 10+ years ago in which he "tried to overdose on alcohol"  Wali Chi denies historical non-suicidal self injurious behavior   Wali Chi is future-oriented and demonstrates self preservation as evidenced by today's evaluation in which Wali Chi is seeking psychiatric intervention to improve overall mental health and outlook on life  Melba Aguilar denies a pervasive history of worthlessness, hopelessness, or guilt  PHQ-9 score obtained during today's visit was 10  Outside of use of cocaine, Melba Aguilar vehemently denies any acute or chronic history suggestive of an underlying affective (bipolar) organization  Melba Aguilar denies previous episodes of elevated/expansive mood, lengthy periods without sleep, grandiosity, or intense and prolonged irritability  As mentioned previous, he does experience bouts of anger and irritability, but these are not constant and are secondary to acute psychosocial stressors and unmanaged anxiety  Melba Aguilar denies atypical periods of increased goal-directed behavior, excessive spending, or sexual promiscuity  During today's evaluation, Melba Aguilar does not exhibit objective evidence of hypomania/leonid  Melba Aguilar is mostly organized in thought without flight of ideas or loosening of associations  Speech does not appear to be pressured or rapid and Melba Aguilar responds well to verbal redirecting  Melba Aguilar denies historical symptomatology suggestive of an underlying psychotic process  Melba Aguilar does not currently endorse acute perceptual disturbances such as A/V hallucinations, paranoia, referential ideation, or delusions  Melba Aguilar denies acute and chronic Schneiderian symptoms, including: thought-broadcasting, thought-insertion, thought-withdrawal or audible thoughts  During today's evaluation, Melba Aguilar does not exhibit objective evidence of barney psychosis as the patient does not appear internally preoccupied or easily distracted  Cains thoughts are organized, linear, and reality-based  Melba Aguilar denies historical symptomatology suggestive of PTSD, OCD, or disordered eating         Current Rating Scores:     Current PHQ-9   PHQ-9 Score (since 3/6/2021)     PHQ-9 Score  10        Current DAVI-7 is   DAVI-7 Flowsheet Screening      Most Recent Value   Over the last two weeks, how often have you been bothered by the following problems? Feeling nervous, anxious, or on edge  2   Not being able to stop or control worrying  0   Worrying too much about different things  0   Trouble relaxing   2   Being so restless that it's hard to sit still  1   Becoming easily annoyed or irritable   3   Feeling afraid as if something awful might happen  0   How difficult have these problems made it for you to do your work, take care of things at home, or get along with other people? Somewhat difficult   DAVI Score   8            Psychiatric Review Of Systems:    Sleep changes: yes, decreased  Appetite changes: no  Weight changes: yes, weight loss  (purposeful)  Energy/anergy: yes, decreased  Interest/pleasure/anhedonia: no  Somatic symptoms: no  Anxiety/panic: yes, worrying, worrying daily  Saira: no  Guilty/hopeless: no  Self injurious behavior/risky behavior: no  Suicidal ideation: no  Homicidal ideation: no  Auditory hallucinations: no  Visual hallucinations: no  Other hallucinations: no  Delusional thinking: no  Eating disorder history: no  Obsessive/compulsive symptoms: no    Review Of Systems:    Constitutional feeling tired, low energy, recent weight loss (40lbs in 7 months - purposeful lbs) and as noted in HPI   ENT dizziness   Cardiovascular negative   Respiratory negative   Gastrointestinal negative   Genitourinary negative   Musculoskeletal neck pain, hand pain, wrist pain, limb pain and muscle aches   Integumentary negative   Neurological decreased memory, dizziness and unstable gait   Endocrine negative   Other Symptoms none, all other systems are negative       Family Psychiatric History:     Family History   Problem Relation Age of Onset    Alcohol abuse Mother     Anxiety disorder Mother     Depression Mother     Anxiety disorder Brother     Depression Brother          Past Psychiatric History:     Inpatient psychiatric admissions: Denies  Prior outpatient psychiatric linkage: Denies  Past/current psychotherapy: Received therapy while in Rehab in 2010 - no current therapy linkage  History of suicidal attempts/gestures: 1x in 2010 when he "excessively drank" and hope he didn't awake   History of violence/aggressive behaviors: Denies  Psychotropic medication trials: "stimulant for ADHD"  Substance abuse inpatient/outpatient rehabilitation: 1x - 2010 at [facility] in [place]    Substance Abuse History:    Extensive history of ETOH, illict substance (cocaine, cannabis, xanax, opiates), and tobacco abuse  States that he now only uses cannabis (1-2x per week) and daily nicotine use  He has been sober from other substances for 5+ years  No past legal actions or arrests secondary to substance intoxication  The patient denies prior DWIs/DUIs  Lethaniel Carmen does not exhibit objective evidence of substance withdrawal during today's examination nor does Lethaniel Carmen appear under the influence of any psychoactive substance  Social History:    Developmental: Denies a history of milestone/developmental delay  Denies a history of in-utero exposure to toxins/illicit substances  There is no documented history of IEP or need for special education  Mother and father  when patient was in 2nd grade  Education: high school diploma/GED  Marital history: single  Living arrangement, social support: friend(s), has social supports at the [employer] where he works   Occupational History: Employed full time at [employer], trains individuals to fight MMA  Access to firearms: Denies direct access to weapons/firearms  Shyla Zimmerman has no history of arrests or violence with a deadly weapon  Traumatic History:     Abuse:none is reported  Other Traumatic Events: Numerous concussions ("at least 5 maybe 10") via kickboxing and exposure to MMA    Past Medical History:    Past Medical History:   Diagnosis Date    Diabetes mellitus ([facility] [address] )      No past medical history pertinent negatives    No past surgical history on file  No Known Allergies    History Review: The following portions of the patient's history were reviewed and updated as appropriate: allergies, current medications, past family history, past medical history, past social history, past surgical history and problem list     OBJECTIVE:    Vital signs in last 24 hours: There were no vitals filed for this visit      Mental Status Evaluation:    Appearance age appropriate, casually dressed, dressed appropriately, looks stated age   Behavior pleasant, cooperative, mildly anxious, fair eye contact   Speech normal rate, normal volume, normal pitch, fluent, clear   Mood dysphoric, anxious   Affect constricted   Thought Processes organized, logical, goal directed, normal rate of thoughts, normal abstract reasoning   Associations concrete associations   Thought Content no overt delusions   Perceptual Disturbances: no auditory hallucinations, no visual hallucinations   Abnormal Thoughts  Risk Potential Suicidal ideation - None  Homicidal ideation - None  Potential for aggression - No   Orientation oriented to person, place, time/date and situation   Memory recent and remote memory grossly intact   Consciousness alert and awake   Attention Span Concentration Span attention span and concentration are age appropriate   Intellect appears to be of average intelligence   Insight fair   Judgement fair   Muscle Strength and  Gait unable to assess today due to virtual visit   Motor Activity no abnormal movements   Language no difficulty naming common objects, no difficulty repeating a phrase   Fund of Knowledge adequate knowledge of current events  adequate fund of knowledge regarding past history  adequate fund of knowledge regarding vocabulary    Pain moderate   Pain Scale did not ask to rate       Laboratory Results: I have personally reviewed all pertinent laboratory/tests results    Recent Labs (last 2 months):   No visits with results within 2 Month(s) from this visit  Latest known visit with results is:   Lab on 01/20/2021   Component Date Value    WBC 01/20/2021 5 89     RBC 01/20/2021 4 59     Hemoglobin 01/20/2021 13 7     Hematocrit 01/20/2021 42 5     MCV 01/20/2021 93     MCH 01/20/2021 29 8     MCHC 01/20/2021 32 2     RDW 01/20/2021 13 8     MPV 01/20/2021 10 1     Platelets 01/62/3675 335     nRBC 01/20/2021 0     Neutrophils Relative 01/20/2021 68     Immat GRANS % 01/20/2021 0     Lymphocytes Relative 01/20/2021 21     Monocytes Relative 01/20/2021 5     Eosinophils Relative 01/20/2021 5     Basophils Relative 01/20/2021 1     Neutrophils Absolute 01/20/2021 4 03     Immature Grans Absolute 01/20/2021 0 01     Lymphocytes Absolute 01/20/2021 1 25     Monocytes Absolute 01/20/2021 0 29     Eosinophils Absolute 01/20/2021 0 27     Basophils Absolute 01/20/2021 0 04     Sodium 01/20/2021 136     Potassium 01/20/2021 4 5     Chloride 01/20/2021 105     CO2 01/20/2021 27     ANION GAP 01/20/2021 4     BUN 01/20/2021 12     Creatinine 01/20/2021 1 15     Glucose, Fasting 01/20/2021 300*    Calcium 01/20/2021 9 1     AST 01/20/2021 26     ALT 01/20/2021 28     Alkaline Phosphatase 01/20/2021 124*    Total Protein 01/20/2021 7 0     Albumin 01/20/2021 3 6     Total Bilirubin 01/20/2021 0 76     eGFR 01/20/2021 84     Hemoglobin A1C 01/20/2021 7 6*    EAG 01/20/2021 171     Cholesterol 01/20/2021 214*    Triglycerides 01/20/2021 111     HDL, Direct 01/20/2021 47     LDL Calculated 01/20/2021 145*    Non-HDL-Chol (CHOL-HDL) 01/20/2021 167     Creatinine, Ur 01/20/2021 84 2     Microalbum  ,U,Random 01/20/2021 <5 0     Microalb Creat Ratio 01/20/2021 <6        Suicide/Homicide Risk Assessment:    Risk of Harm to Self:  The following ratings are based on assessment at the time of the interview and review of records  Demographic risk factors include: , never , male  Historical Risk Factors include: history of depression, history of anxiety, history of substance use  Recent Specific Risk Factors include: current anxiety symptoms  Protective Factors: no current suicidal ideation, ability to adapt to change, access to mental health treatment, compliant with medications, compliant with mental health treatment, connection to community, contact with caregivers, cultural beliefs discouraging suicide, effective coping skills, effective decision-making skills, effective problem solving skills, having a desire to be alive, having a sense of purpose or meaning in life, healthy fear of risky behaviors and pain, medical compliance, opportunities to contribute to community, opportunities to participate in community, restricted access to lethal means, stable living environment, stable job, sense of determination, sense of importance of health and wellness, sense of personal control, sobriety, supportive family, supportive friends  Weapons: none  The following steps have been taken to ensure weapons are properly secured: not applicable  Based on today's assessment, Nithya Soliman presents the following risk of harm to self: low    Risk of Harm to Others: The following ratings are based on assessment at the time of the interview and review of records  Demographic Risk Factors include: male, under age 36  Historical Risk Factors include: none  Recent Specific Risk Factors include: none  Protective Factors: no current homicidal ideation  Weapons: none  The following steps have been taken to ensure weapons are properly secured: not applicable  Based on today's assessment, Nithya Soliman presents the following risk of harm to others: none    The following interventions are recommended: no intervention changes needed  Although patient's acute lethality risk is LOW, long-term/chronic lethality risk is mildly elevated given limited social supports, extensive history of polysubstance abuse, male sex, and prior suicidal gesture   However, at the current moment, Adriana Alford is future-oriented, forward-thinking, and demonstrates ability to act in a self-preserving manner as evidenced by volitionally presenting to the clinic today, seeking treatment  Additionally, Adriana Alford has refrained from abusing ETOH or illicit substances (aside from cannabis) for many years and personally requests not being prescribed Xanax or any other addictive agent, suggesting a will and desire to live  He lists family and career aspirations as protective factors against suicide  At this juncture, inpatient hospitalization is not currently warranted  To mitigate future risk, patient should adhere to treatment recommendations, avoid alcohol/illicit substance use, utilize community-based resources and familiar support, and prioritize mental health treatment  Assessment/Plan:       Gabi Bob is a 31 y o  male with a past psychiatric history significant for depression, anxiety, alcohol use disorder (in remission), nicotine use disorder, and previous episodes of polysubstance abuse (cocaine, benzodiazapine's, opiates) who presents to the [address] [place] [address] E outpatient clinic for intake assessment  Adriana Alford was referred to the clinic secondary to worsening anxiety and episodic bouts of depression, that are worse in the winter months  Adriana Alford endorses a longstanding history of polysubstance abuse that likely contributed to worsening mood symptomatology 1 decade ago  Adriana Alford is pleased to report today that he has been sober from alcohol, "pills", and cocaine for many years  He continues to use nicotine daily and cannabis 1-2x per week  In addition to polysubstance abuse, Adriana Alford has an extensive history of head trauma secondary to years of fighting in [address] [place] [address] Avenue  He states that he has suffered 3 concussions "just this year"  This also has most likely contributed to mood symptomatology and anxiety  Acutely, Adriana Alford states that his main concern is managing his anxiety   He admits to daily worry, excessive nervousness, and constantly feeling "tense" and on-edge  When overtly anxious, he endorses profound irritability, anger outbursts, and limited frustration tolerance  During these times, he is able to use mindfulness exercises learned in [facility] to decompress  These episodes happen approximately 1-2x per week  Yair Mckoy admits to being easily annoyed and restless  He finds it difficult to relax and concentrate at times, secondary to anxious ruminations  He denies panic symptomatology or any physical manifestations of anxiety  DAVI-7 score obtained during today's visit was 8  Yair Mckoy currently denies neurovegetative symptomatology suggestive of major depressive disorder or dysthymia  He does endorse a seasonal pattern to his dysphoria which has improved over the last 1 month  Yair Mckoy adamantly denies acute thoughts of suicide or self-harm  Yair Mckoy has no plans to harm others  There is no documented history of prior suicidal attempts but Yair Mckoy does admit to a suicidal gesture 10+ years ago in which he "tried to overdose on alcohol"  PHQ-9 score obtained during today's visit was 10  Outside of use of cocaine, Yair Mckoy vehemently denies any acute or chronic history suggestive of an underlying affective (bipolar) organization  Yair Mckoy denies previous episodes of elevated/expansive mood, lengthy periods without sleep, grandiosity, or intense and prolonged irritability  As mentioned previous, he does experience bouts of anger and irritability, but these are not constant and are secondary to acute psychosocial stressors and unmanaged anxiety  Yair Mckoy denies historical symptomatology suggestive of an underlying psychotic process  Psychopharmacologically, I spoke at length with Yair Mckoy about medication options available to treat anxiety  He was also educated on ways to mitigate risk of further head trauma as he continues to engage in [address] [place] [address] activities   His history of head injury and numerous mTBIs likely contributes to his poor frustration tolerance, disinhibit, and anger outbursts  Acutely, given ongoing pain associated with MMA fighting and anxiety, will start Cymbalta given its anxiolytic benefits and pain management properties  Risks/benefits/alternativies to treatment discussed, including a myriad of potential adverse medication side effects, to which Silva Ni voiced understanding and consented fully to treatment  DSM-V Diagnoses:     1 ) Generalized Anxiety Disorder  2 ) Unspecified Depressive Disorder  3 ) Nicotine Use Disorder  4   Hx of Polysubstance Abuse (cocaine, benzodiazapine, opiates)      Treatment Recommendations/Precautions:      1 ) Generalized Anxiety Disorder  - Start Cymbalta 30mg Daily to target anxiety, depressive symptomatology, and 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  - Not interested in weekly psychotherapy at the moment  - Psychoeducation provided regarding the importance of exercise and healthy dietary choices and their impact on mood, energy, and motivation  - Counseled to avoid ETOH, illict substances, and nicotine secondary to the detrimental effects of these substances on mental and physical health  - Encouraged to engage in non-verbal forms of therapy such as art therapy, music therapy, and mindfulness  - Discussed the bio-psycho-social model to treatment and therapeutic exercises/interventions were attempted to cognitively restructure thoughts    2 ) Unspecified Depressive Disorder  - Start Cymbalta 30mg Daily to target anxiety, depressive symptomatology, and 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  - Not interested in weekly psychotherapy at the moment  - Psychoeducation provided regarding the importance of exercise and healthy dietary choices and their impact on mood, energy, and motivation  - Counseled to avoid ETOH, illict substances, and nicotine secondary to the detrimental effects of these substances on mental and physical health  - Encouraged to engage in non-verbal forms of therapy such as art therapy, music therapy, and mindfulness  - Discussed the bio-psycho-social model to treatment and therapeutic exercises/interventions were attempted to cognitively restructure thoughts    3 ) Nicotine Use Disorder  - Counseled to avoid ETOH, illict substances, and nicotine secondary to the detrimental effects of these substances on mental and physical health    4  Hx of Polysubstance Abuse (cocaine, benzodiazapine, opiates)  - Sobriety for 5+ years, attended rehab in 2010  - Counseled to avoid ETOH, illict substances, and nicotine secondary to the detrimental effects of these substances on mental and physical health      Medication management every 8 weeks  Aware of need to follow up with family physician for medical issues  Aware of 24 hour and weekend coverage for urgent situations accessed by calling [facility] main practice number    Medications Risks/Benefits:      Risks, Benefits And Possible Side Effects Of Medications:    Risks, benefits, and possible side effects of medications explained to [facility] including risk of suicidality and serotonin syndrome related to treatment with antidepressants  He verbalizes understanding and agreement for treatment  Controlled Medication Discussion:     No recent records found for controlled prescriptions according to [place] Prescription Drug Monitoring Program    Treatment Plan:    Completed and signed during the session: Yes - Treatment Plan done but not signed at time of office visit due to:  Plan reviewed in person and verbal consent given due to Tesha social distkuldeep      Note Share Disclaimer:      This note was not shared with the patient due to reasonable likelihood of causing patient harm      Nilda Gabriel MD 04/06/21

## 2021-04-08 ENCOUNTER — HOSPITAL ENCOUNTER (EMERGENCY)
Facility: HOSPITAL | Age: 31
Discharge: LEFT BEFORE DISCHARGE | End: 2021-04-08
Attending: EMERGENCY MEDICINE | Admitting: EMERGENCY MEDICINE
Payer: COMMERCIAL

## 2021-04-08 VITALS
OXYGEN SATURATION: 100 % | HEART RATE: 71 BPM | WEIGHT: 166.67 LBS | BODY MASS INDEX: 26.79 KG/M2 | TEMPERATURE: 97.6 F | RESPIRATION RATE: 18 BRPM | HEIGHT: 66 IN | DIASTOLIC BLOOD PRESSURE: 73 MMHG | SYSTOLIC BLOOD PRESSURE: 130 MMHG

## 2021-04-08 DIAGNOSIS — E16.2 HYPOGLYCEMIA: Primary | ICD-10-CM

## 2021-04-08 DIAGNOSIS — R55 SYNCOPE: ICD-10-CM

## 2021-04-08 LAB
ATRIAL RATE: 59 BPM
GLUCOSE SERPL-MCNC: 156 MG/DL (ref 65–140)
GLUCOSE SERPL-MCNC: 76 MG/DL (ref 65–140)
GLUCOSE SERPL-MCNC: 76 MG/DL (ref 65–140)
P AXIS: 30 DEGREES
PR INTERVAL: 128 MS
QRS AXIS: 88 DEGREES
QRSD INTERVAL: 96 MS
QT INTERVAL: 424 MS
QTC INTERVAL: 410 MS
T WAVE AXIS: 34 DEGREES
VENTRICULAR RATE: 56 BPM

## 2021-04-08 PROCEDURE — 99285 EMERGENCY DEPT VISIT HI MDM: CPT | Performed by: EMERGENCY MEDICINE

## 2021-04-08 PROCEDURE — 93010 ELECTROCARDIOGRAM REPORT: CPT | Performed by: INTERNAL MEDICINE

## 2021-04-08 PROCEDURE — 82948 REAGENT STRIP/BLOOD GLUCOSE: CPT

## 2021-04-08 PROCEDURE — 93005 ELECTROCARDIOGRAM TRACING: CPT

## 2021-04-08 PROCEDURE — 99284 EMERGENCY DEPT VISIT MOD MDM: CPT

## 2021-04-08 RX ORDER — DEXTROSE MONOHYDRATE 25 G/50ML
INJECTION, SOLUTION INTRAVENOUS
Status: COMPLETED
Start: 2021-04-08 | End: 2021-04-08

## 2021-04-08 RX ADMIN — DEXTROSE MONOHYDRATE 50 ML: 25 INJECTION, SOLUTION INTRAVENOUS at 19:31

## 2021-04-08 NOTE — ED PROVIDER NOTES
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 that he needs a break and doesn't feel well and he woke up in ambulance  Pt is type 1 diabetic and per EMS bs was 46  EMS gave glucagon and D10 via IV and sugar was in 90's  Just checked BS now and is 76  Patient is a 30 y/o male comes to the Emergency department by EMS for evaluation of syncope and blood sugar levels  Patient has a past medical history of T1DM, Hyperlipidemia, MDD  Symptomatology started today, patient was at the gym and In his way out he noted some double vision, weakness and shakiness in his upper extremities, he recognized the symptoms so he  ingested a granola bar , he doesn't recall anything else other than waking up in an ambulance, he was told his blood sugar levels were at 46  current insulin therapy includes NPH 12 U before breakfast, Regular I 15 U TID for which he states compliance and no recent dose missing/adjustments, he check his blood sugar levels 6-7 times a day and he usually runs between 80 to 180 max ( highest at morning time)  prior to the episode patient decreased his carbohydrates intake at lunch and he has also been experiencing multiple episodes of watery diarrhea that he attributes to been related to increasing fiber ingestion in his diet  Possible etiology: diabetic hypoglycemia in the setting of increased physical activity w/ low carb ingestion  Prior to Admission Medications   Prescriptions Last Dose Informant Patient Reported? Taking?    DULoxetine (CYMBALTA) 30 mg delayed release capsule 4/8/2021 at Unknown time  No Yes   Sig: Take 1 capsule (30 mg total) by mouth daily   atorvastatin (LIPITOR) 10 mg tablet Not Taking at Unknown time  No No   Sig: Take 1 tablet (10 mg total) by mouth daily   Patient not taking: Reported on 4/8/2021   insulin NPH (HumuLIN N,NovoLIN N) 100 Units/mL subcutaneous injection 2021 at Unknown time  No Yes   Sig: Inject 12 Units under the skin daily before breakfast   insulin regular (HumuLIN R,NovoLIN R) 100 units/mL injection 2021 at Unknown time  No Yes   Sig: Inject 0 15 mL (15 Units total) under the skin 3 (three) times a day before meals      Facility-Administered Medications: None       Past Medical History:   Diagnosis Date    Diabetes mellitus ([facility] [address] )        History reviewed  No pertinent surgical history  Family History   Problem Relation Age of Onset    Alcohol abuse Mother     Anxiety disorder Mother     Depression Mother     Anxiety disorder Brother     Depression Brother      I have reviewed and agree with the history as documented  E-Cigarette/Vaping    E-Cigarette Use Never User      E-Cigarette/Vaping Substances    Nicotine No     THC No     CBD No     Flavoring No      Social History     Tobacco Use    Smoking status: Former Smoker     Quit date:      Years since quittin 2    Smokeless tobacco: Current User     Types: Chew   Substance Use Topics    Alcohol use: No    Drug use: Yes     Types: Marijuana        Review of Systems   Constitutional: Positive for activity change and fatigue  Neurological: Positive for tremors, syncope and weakness  All other systems reviewed and are negative  Physical Exam  ED Triage Vitals [21]   Temperature Pulse Respirations Blood Pressure SpO2   97 6 °F (36 4 °C) 71 18 130/73 100 %      Temp Source Heart Rate Source Patient Position - Orthostatic VS BP Location FiO2 (%)   Oral Monitor Lying Right arm --      Pain Score       No Pain             Orthostatic Vital Signs  Vitals:    21   BP: 130/73   Pulse: 71   Patient Position - Orthostatic VS: Lying       Physical Exam  Vitals signs and nursing note reviewed  Constitutional:       General: He is not in acute distress  Appearance: Normal appearance  He is normal weight  He is not toxic-appearing or diaphoretic     HENT: Head: Normocephalic and atraumatic  Right Ear: Tympanic membrane normal       Left Ear: Tympanic membrane normal       Nose: Nose normal  No congestion or rhinorrhea  Mouth/Throat:      Mouth: Mucous membranes are moist       Pharynx: Oropharynx is clear  No oropharyngeal exudate or posterior oropharyngeal erythema  Eyes:      Extraocular Movements: Extraocular movements intact  Conjunctiva/sclera: Conjunctivae normal       Pupils: Pupils are equal, round, and reactive to light  Neck:      Musculoskeletal: Normal range of motion and neck supple  No neck rigidity or muscular tenderness  Cardiovascular:      Rate and Rhythm: Normal rate  Pulses: Normal pulses  Heart sounds: No murmur  No gallop  Pulmonary:      Effort: Pulmonary effort is normal  No respiratory distress  Breath sounds: Normal breath sounds  No wheezing or rales  Chest:      Chest wall: No tenderness  Abdominal:      General: Bowel sounds are normal  There is no distension  Palpations: Abdomen is soft  Tenderness: There is no abdominal tenderness  Musculoskeletal: Normal range of motion  General: No swelling or tenderness  Skin:     General: Skin is warm  Capillary Refill: Capillary refill takes 2 to 3 seconds  Neurological:      Mental Status: He is alert and oriented to person, place, and time  Psychiatric:         Mood and Affect: Mood normal          Behavior: Behavior normal          Thought Content:  Thought content normal          Judgment: Judgment normal          ED Medications  Medications   dextrose 50 % IV solution **ADS Override Pull** (50 mL  Given 4/8/21 1931)       Diagnostic Studies  Results Reviewed     Procedure Component Value Units Date/Time    Fingerstick Glucose (POCT) [[id number]]  (Normal) Collected: 04/08/21 2022    Lab Status: Final result Updated: 04/08/21 2030     POC Glucose 76 mg/dl     CBC and differential [[id number]]     Lab Status: No result Specimen: Blood     Comprehensive metabolic panel [[id number]]     Lab Status: No result Specimen: Blood     Fingerstick Glucose (POCT) [[id number]]  (Abnormal) Collected: 04/08/21 1945    Lab Status: Final result Updated: 04/08/21 1946     POC Glucose 156 mg/dl     Fingerstick Glucose (POCT) [[id number]]  (Normal) Collected: 04/08/21 1916    Lab Status: Final result Updated: 04/08/21 1920     POC Glucose 76 mg/dl                  No orders to display         Procedures  ECG 12 Lead Documentation Only    Date/Time: 4/8/2021 9:05 PM  Performed by: Lucinda Kussmaul, MD  Authorized by: Lucinda Kussmaul, MD     ECG reviewed by me, the ED Provider: yes    Patient location:  ED  Previous ECG:     Previous ECG:  Compared to current    Comparison ECG info:  Feb 7/ 2017 : normal sinus    Similarity:  No change  Interpretation:     Interpretation: normal    Rate:     ECG rate:  56    ECG rate assessment: bradycardic    Rhythm:     Rhythm: sinus rhythm    Ectopy:     Ectopy: none    QRS:     QRS axis:  Normal  Conduction:     Conduction: normal    ST segments:     ST segments:  Normal  T waves:     T waves: normal            ED Course                             SBIRT 22yo+      Most Recent Value   SBIRT (24 yo +)   In order to provide better care to our patients, we are screening all of our patients for alcohol and drug use  Would it be okay to ask you these screening questions? Yes Filed at: 04/08/2021 3813   Initial Alcohol Screen: US AUDIT-C    1  How often do you have a drink containing alcohol?  0 Filed at: 04/08/2021 1923   2  How many drinks containing alcohol do you have on a typical day you are drinking? 0 Filed at: 04/08/2021 1923   3a  Male UNDER 65: How often do you have five or more drinks on one occasion? 0 Filed at: 04/08/2021 1923   3b  FEMALE Any Age, or MALE 65+: How often do you have 4 or more drinks on one occassion?   0 Filed at: 04/08/2021 1923   Audit-C Score  0 Filed at: 04/08/2021 2074   SEBASTIEN: How many times in the past year have you    Used an illegal drug or used a prescription medication for non-medical reasons? Never Filed at: 04/08/2021 1923                MDM    Disposition  Final diagnoses:   Hypoglycemia   Syncope     Time reflects when diagnosis was documented in both MDM as applicable and the Disposition within this note     Time User Action Codes Description Comment    4/8/2021  8:45 PM Kristin Ochoa Add [E16 2] Hypoglycemia     4/8/2021  8:46 PM Betina Ochoa Add [R55] Syncope       ED Disposition     ED Disposition Condition Date/Time Comment    MALU Smith Apr 8, 2021  8:47 PM Date: 4/8/2021  Patient: Josh Moreno  Admitted: 4/8/2021  7:11 PM  Attending Provider: Juan Smith or his authorized caregiver has made the decision for the patient to leave the emergency department morris nst the advice of the emergency department staff  He or his authorized caregiver has been informed and understands the inherent risks, including death, permanent disability  He or his authorized caregiver has decided to accept the responsibility for  this decision  Josh Moreno and all necessary parties have been advised that he may return for further evaluation or treatment  His condition at time of discharge was stable    Josh Moreno had current vital signs as follows:  / 73 (BP Location: Right arm)   Pulse 71   Temp 97 6 °F (36 4 °C) (Oral)   Resp 18   Ht 5' 6" (1 676 m)   Wt 75 6 kg (166 lb 10 7 oz)         Follow-up Information     Follow up With Specialties Details Why Contact Info Additional Information    Ezra Cabello MD Family Medicine Call   [address] [facility] Emergency Department Emergency Medicine Go to  If symptoms worsen [address] Emergency Department, [address] [place] [address] [place], [place], [address]          Discharge Medication List as of 4/8/2021  8:50 PM      CONTINUE these medications which have NOT CHANGED    Details   DULoxetine (CYMBALTA) 30 mg delayed release capsule Take 1 capsule (30 mg total) by mouth daily, Starting Tue 4/6/2021, Normal      insulin NPH (HumuLIN N,NovoLIN N) 100 Units/mL subcutaneous injection Inject 12 Units under the skin daily before breakfast, Starting Mon 1/25/2021, No Print      insulin regular (HumuLIN R,NovoLIN R) 100 units/mL injection Inject 0 15 mL (15 Units total) under the skin 3 (three) times a day before meals, Starting Mon 1/25/2021, No Print      atorvastatin (LIPITOR) 10 mg tablet Take 1 tablet (10 mg total) by mouth daily, Starting Mon 1/25/2021, Normal           No discharge procedures on file  PDMP Review     None           ED Provider  Attending physically available and evaluated Aly Savage  KAREN managed the patient along with the ED Attending      Electronically Signed by         Lucinda Kussmaul, MD  04/08/21 5826       Lucinda Kussmaul, MD  04/08/21 5611

## 2021-04-09 NOTE — ED NOTES
Pt states that his blood sugar is normally in the 80's and he would like to go home  Dr DION Blanca, RN  04/08/21 2029

## 2021-04-09 NOTE — ED NOTES
Pt declines blood sugar recheck in one hour  Requests to sign out AMA  Provider at bedside        Joie Wallace RN  04/08/21 2044

## 2021-04-10 NOTE — ED ATTENDING ATTESTATION
4/8/2021  IErika MD, saw and evaluated the patient  I have discussed the patient with the resident/non-physician practitioner and agree with the resident's/non-physician practitioner's findings, Plan of Care, and MDM as documented in the resident's/non-physician practitioner's note, except where noted  All available labs and Radiology studies were reviewed  I was present for key portions of any procedure(s) performed by the resident/non-physician practitioner and I was immediately available to provide assistance  At this point I agree with the current assessment done in the Emergency Department  I have conducted an independent evaluation of this patient a history and physical is as follows:    69-year-old type 1 diabetic presents to the emergency department following loss of consciousness and identification hypoglycemia  He explains that he ate less carbs than he intended at lunch (half a cup of rice instead of a full cup of rice) &bolused for the larger quantity of carbs  Approximately a quarter of the way through his participation in [employer] he began feeling poorly as though he might pass out  He notes that it has been awhile though symptoms were identical to that upon experiencing hypoglycemia in the past   He ate a granola bar after exiting and sat down  He then recalls waking in the ambulance  His glucose was found to be in the 40s  He was given dextrose and glucose was low again here  Additional dextrose given with improvement of glucose to 156  Patient reports feeling well at this time  He has not experienced any chest discomfort, palpitations or dyspnea  No headache  He denies having felt ill over the last few days noting the only slight change was presence of looser stools a couple of days ago  He attributes this to increased fiber in his diet    He takes consistent amounts of his insulin and checks his glucose very regularly with maintenance hemoglobin A1c in the low 6s  He denies having appreciated lower than typical glucoses recently  He is not on any anti-platelet or anticoagulant medications  Denies having any discomfort from today's event    He is well-appearing with moist mucous membranes  Heart sounds regular  Lungs clear to auscultation bilaterally  He is not diaphoretic or tachycardic  Speech is clear and no asymmetry or abnormality is appreciated in movement  Discussed plan to provide meal, recheck glucose level shortly after and again after another hour or so to assess for maintenance appropriate level  Have concern despite bolus of dextrose administration his glucose level lowered  Do suspect that today's hypoglycemia was the result of lower carb consumption combined with physical activity as he does not provide history alternate etiology or recent difficulty with levels  ED Course   Patient expressed significant eagerness for discharge  He indicated that family member would provide him with a ride and be present with him  He notes that he has been managing his diabetes for several years and is able to monitor his level and adjust oral intake accordingly tonight  Recurrent hypoglycemia concern was reviewed with him  He maintained desire for discharge prior to ensuring glucose was staying elevated and therefore his leaving was considered against medical advice  He is aware to return as needed for any worsening/new concerns        Critical Care Time  Procedures

## 2021-04-14 ENCOUNTER — TELEPHONE (OUTPATIENT)
Dept: PSYCHIATRY | Facility: CLINIC | Age: 31
End: 2021-04-14

## 2021-04-28 DIAGNOSIS — F41.1 GENERALIZED ANXIETY DISORDER: ICD-10-CM

## 2021-04-28 RX ORDER — DULOXETIN HYDROCHLORIDE 30 MG/1
30 CAPSULE, DELAYED RELEASE ORAL DAILY
Qty: 30 CAPSULE | Refills: 1 | Status: SHIPPED | OUTPATIENT
Start: 2021-04-28 | End: 2021-05-28 | Stop reason: SDUPTHER

## 2021-05-28 ENCOUNTER — TELEPHONE (OUTPATIENT)
Dept: PSYCHIATRY | Facility: CLINIC | Age: 31
End: 2021-05-28

## 2021-05-28 DIAGNOSIS — F41.1 GENERALIZED ANXIETY DISORDER: ICD-10-CM

## 2021-05-28 RX ORDER — DULOXETIN HYDROCHLORIDE 30 MG/1
30 CAPSULE, DELAYED RELEASE ORAL DAILY
Qty: 30 CAPSULE | Refills: 1 | Status: SHIPPED | OUTPATIENT
Start: 2021-05-28 | End: 2021-06-01 | Stop reason: SDUPTHER

## 2021-06-01 DIAGNOSIS — F41.1 GENERALIZED ANXIETY DISORDER: ICD-10-CM

## 2021-06-01 RX ORDER — DULOXETIN HYDROCHLORIDE 30 MG/1
30 CAPSULE, DELAYED RELEASE ORAL DAILY
Qty: 90 CAPSULE | Refills: 0 | Status: SHIPPED | OUTPATIENT
Start: 2021-06-01 | End: 2021-09-01 | Stop reason: SDUPTHER

## 2021-06-09 ENCOUNTER — TELEPHONE (OUTPATIENT)
Dept: PSYCHIATRY | Facility: CLINIC | Age: 31
End: 2021-06-09

## 2021-07-14 ENCOUNTER — TELEMEDICINE (OUTPATIENT)
Dept: PSYCHIATRY | Facility: CLINIC | Age: 31
End: 2021-07-14
Payer: COMMERCIAL

## 2021-07-14 DIAGNOSIS — F32.A DEPRESSIVE DISORDER: ICD-10-CM

## 2021-07-14 DIAGNOSIS — F19.10 POLYSUBSTANCE ABUSE (HCC): ICD-10-CM

## 2021-07-14 DIAGNOSIS — F41.1 GENERALIZED ANXIETY DISORDER: Primary | ICD-10-CM

## 2021-07-14 DIAGNOSIS — F17.200 NICOTINE USE DISORDER: ICD-10-CM

## 2021-07-14 PROCEDURE — 99213 OFFICE O/P EST LOW 20 MIN: CPT | Performed by: STUDENT IN AN ORGANIZED HEALTH CARE EDUCATION/TRAINING PROGRAM

## 2021-07-14 PROCEDURE — 90833 PSYTX W PT W E/M 30 MIN: CPT | Performed by: STUDENT IN AN ORGANIZED HEALTH CARE EDUCATION/TRAINING PROGRAM

## 2021-07-14 NOTE — PSYCH
MEDICATION MANAGEMENT NOTE        [facility]      Name and Date of Birth:  Naga Holt 31 y o  1990 MRN: [id number]    Date of Visit: July 14, 2021     Reason for Visit: Follow-up visit for medication management     Virtual Visit Disclaimer: The patient was identified by name and date of birth  Naga Holt was informed that this is a telemedicine visit and that the visit is being conducted through [address] [place] [address] Now and patient was informed that this is a secure, HIPAA-compliant platform  He agrees to proceed    My office door was closed  No one else was in the room  He acknowledged consent and understanding of privacy and security of the video platform  The patient has agreed to participate and understands they can discontinue the visit at any time  Patient is aware this is a billable service  SUBJECTIVE:    Naga Holt is a 31 y o  male with past psychiatric history significant for Generalized Anxiety Disorder, Unspecified Depressive Disorder, Nicotine Use Disorder, and Hx of Polysubstance Abuse (cocaine, benzodiazapine, opiates) who was personally seen and evaluated today at the [address] [place] [address] E outpatient clinic for follow-up and medication management  Darrick Blanchard presents as calm, pleasant, and cooperative  His thoughts are organized, linear, and goal-directed and he completes psychiatric assessment without difficulty  During intake session, Darrick Blanchard was started on Cymbalta 30mg Daily to target overwhelming anxiety, mood lability, and pain associated with sports injuries  He tolerated this change well and endorses overall improvement in mood and management of anxiety  His sleep is adequate and restorative  His appetite is robust  He admits to awakening "in a better mood, more optimistic about the day"  Darrick Blanchard denies currently lethality concern  He does not harbor intense thoughts of suicide or self-harm   He has no plans to harm himself or others  He is functioning better socially and occupationally  He is pleased to report that his friends have made comments about him being "more balance and calm"  He denies crying spells or new-onset anhedonia  He is no longer consumed by daily worry or nervousness  He has not experienced panic symptomatology since last visit  He is less irritable, easily angered, or on-edge  Kiko Nieto states that since his anxiety and depression have improved, his use of cannabis and nicotine has reduced dramatically  He now admits to using cannabis "only for help with sleep"  His ingestion of nicotine has been reduced by 75%  Kkio Nieto currently denies symptomatology suggestive of leonid/hypomania  He is not overtly psychotic  With better control of his mood and anxiety, he has been exercising more and eating healthier  As such, his A1c has changed from 7 0 to 6 4  Kiko Nieto is future-oriented and continues to demonstrate self-preservation  He does not currently believe he needs a higher dose of Cymbalta  He inquired about nutritional supplementation and ways this may impact hepatic functioning  Psychoeducation provided and he was encouraged to refrain from use  He was agreeable to follow up in 3 months  He offers no further concerns  Current Rating Scores:     None completed today      Review Of Systems:      Constitutional negative   ENT negative   Cardiovascular negative   Respiratory negative   Gastrointestinal negative   Genitourinary negative   Musculoskeletal arthralgias, myalgias and joint pain   Integumentary negative   Neurological negative   Endocrine negative   Other Symptoms none, all other systems are negative       Past Psychiatric History: (unchanged information from previous note copied and italicized) - Information that is bolded has been updated       Inpatient psychiatric admissions: Denies  Prior outpatient psychiatric linkage: Denies  Past/current psychotherapy: Received therapy while in Rehab in 2010 - no current therapy linkage  History of suicidal attempts/gestures: 1x in 2010 when he "excessively drank" and hope he didn't awake   History of violence/aggressive behaviors: Denies  Psychotropic medication trials: "stimulant for ADHD", Cymbalta   Substance abuse inpatient/outpatient rehabilitation: 1x - 2010 at [facility] in [place]     Substance Abuse History: (unchanged information from previous note copied and italicized) - Information that is bolded has been updated       Extensive history of ETOH, illict substance (cocaine, cannabis, xanax, opiates), and tobacco abuse  States that he now only uses cannabis (1-2x per week) and daily nicotine use  He has been sober from other substances for 5+ years  No past legal actions or arrests secondary to substance intoxication  The patient denies prior DWIs/DUIs  Jovani Sears does not exhibit objective evidence of substance withdrawal during today's examination nor does Jovani Sears appear under the influence of any psychoactive substance        Social History: (unchanged information from previous note copied and italicized) - Information that is bolded has been updated       Developmental: Denies a history of milestone/developmental delay  Denies a history of in-utero exposure to toxins/illicit substances  There is no documented history of IEP or need for special education  Mother and father  when patient was in 2nd grade  Education: high school diploma/GED  Marital history: single  Living arrangement, social support: friend(s), has social supports at the [employer] where he works   Occupational History: Employed full time at [employer], trains individuals to fight MMA  Access to firearms: Denies direct access to weapons/firearms   Kiley Griffith has no history of arrests or violence with a deadly weapon       Traumatic History: (unchanged information from previous note copied and italicized) - Information that is bolded has been updated    Marcella Flores is reported  Other Traumatic Events: Numerous concussions ("at least 5 maybe 10") via kickboxing and exposure to MMA    Past Medical History:    Past Medical History:   Diagnosis Date    Diabetes mellitus ([address] )      No past medical history pertinent negatives  No past surgical history on file  No Known Allergies    Substance Abuse History:    Social History     Substance and Sexual Activity   Alcohol Use No     Social History     Substance and Sexual Activity   Drug Use Yes    Types: Marijuana       Social History:    Social History     Socioeconomic History    Marital status: Single     Spouse name: Not on file    Number of children: Not on file    Years of education: Not on file    Highest education level: Not on file   Occupational History    Not on file   Tobacco Use    Smoking status: Former Smoker     Quit date:      Years since quittin 5    Smokeless tobacco: Current User     Types: Chew   Vaping Use    Vaping Use: Never used   Substance and Sexual Activity    Alcohol use: No    Drug use: Yes     Types: Marijuana    Sexual activity: Not on file   Other Topics Concern    Not on file   Social History Narrative    Not on file     Social Determinants of Health     Financial Resource Strain:     Difficulty of Paying Living Expenses:    Food Insecurity:     Worried About Running Out of Food in the Last Year:     Ran Out of Food in the Last Year:    Transportation Needs:     Lack of Transportation (Medical):      Lack of Transportation (Non-Medical):    Physical Activity:     Days of Exercise per Week:     Minutes of Exercise per Session:    Stress:     Feeling of Stress :    Social Connections:     Frequency of Communication with Friends and Family:     Frequency of Social Gatherings with Friends and Family:     Attends Buddhist Services:     Active Member of Clubs or Organizations:     Attends Club or Organization Meetings:     Marital Status:    Intimate Partner Violence:     Fear of Current or Ex-Partner:     Emotionally Abused:     Physically Abused:     Sexually Abused:        Family Psychiatric History:     Family History   Problem Relation Age of Onset    Alcohol abuse Mother     Anxiety disorder Mother     Depression Mother     Anxiety disorder Brother     Depression Brother        History Review: The following portions of the patient's history were reviewed and updated as appropriate: allergies, current medications, past family history, past medical history, past social history, past surgical history and problem list          OBJECTIVE:     Vital signs in last 24 hours: There were no vitals filed for this visit      Mental Status Evaluation:    Appearance age appropriate, casually dressed, dressed appropriately, looks stated age   Behavior pleasant, cooperative, calm, good eye contact   Speech normal rate, normal volume, normal pitch, fluent, clear   Mood normal, improved, euthymic   Affect normal range and intensity, appropriate   Thought Processes organized, goal directed, normal rate of thoughts, normal abstract reasoning   Associations intact associations   Thought Content no overt delusions   Perceptual Disturbances: no auditory hallucinations, no visual hallucinations   Abnormal Thoughts  Risk Potential Suicidal ideation - None at present  Homicidal ideation - None at present  Potential for aggression - No   Orientation oriented to person, place, time/date and situation   Memory recent and remote memory grossly intact   Consciousness alert and awake   Attention Span Concentration Span attention span and concentration are age appropriate   Intellect appears to be of average intelligence   Insight intact and good   Judgement intact and good   Muscle Strength and  Gait unable to assess today due to virtual visit   Motor activity no abnormal movements   Language no difficulty naming common objects, no difficulty repeating a phrase   Fund of Knowledge adequate knowledge of current events  adequate fund of knowledge regarding past history  adequate fund of knowledge regarding vocabulary    Pain mild   Pain Scale did not ask to rate       Laboratory Results: I have personally reviewed all pertinent laboratory/tests results    Recent Labs (last 2 months):   No visits with results within 2 Month(s) from this visit  Latest known visit with results is:   Admission on 04/08/2021, Discharged on 04/08/2021   Component Date Value    POC Glucose 04/08/2021 76     POC Glucose 04/08/2021 156*    POC Glucose 04/08/2021 76     Ventricular Rate 04/08/2021 56     Atrial Rate 04/08/2021 59     WV Interval 04/08/2021 128     QRSD Interval 04/08/2021 96     QT Interval 04/08/2021 424     QTC Interval 04/08/2021 410     P Axis 04/08/2021 30     QRS Axis 04/08/2021 88     T Wave Axis 04/08/2021 34        Suicide/Homicide Risk Assessment:    The following interventions are recommended: no intervention changes needed      Lethality Statement:    Unchanged from previous assessment      Assessment/Plan:     Ronn Lesches is a 31 y o  male with past psychiatric history significant for Generalized Anxiety Disorder, Unspecified Depressive Disorder, Nicotine Use Disorder, and Hx of Polysubstance Abuse (cocaine, benzodiazapine, opiates) who was personally seen and evaluated today at the [address] [place] [address] outpatient clinic for follow-up and medication management  Kiko Nieto was referred to the clinic secondary to worsening anxiety and episodic bouts of depression, that are worse in the winter months  Kiko Nieto endorses a longstanding history of polysubstance abuse that likely contributed to worsening mood symptomatology 1 decade ago  Kiko Nieto is pleased to report during intake that he has been sober from alcohol, "pills", and cocaine for many years  He continues to use nicotine daily and cannabis 1-2x per week   In addition to polysubstance abuse, Stefanie Vieyra has an extensive history of head trauma secondary to years of fighting in [address] [place] [address]  He states that he has suffered 3 concussions "just this year"  This also has most likely contributed to mood symptomatology and anxiety  During intake session, Stefanie Vieyra stateed that his main concern is managing his anxiety  He admited to daily worry, excessive nervousness, and constantly feeling "tense" and on-edge  When overtly anxious, he endorsed profound irritability, anger outbursts, and limited frustration tolerance  During those times, he is able to use mindfulness exercises learned in [facility] to decompress  At intake, he reported that those episodes were happening approximately 1-2x per week  Stefanie Vieyra admitted to being easily annoyed and restless  He finds it difficult to relax and concentrate at times, secondary to anxious ruminations  He denies panic symptomatology or any physical manifestations of anxiety  Stefanie Vieyra denied neurovegetative symptomatology suggestive of major depressive disorder or dysthymia during intake session  He did endorse a seasonal pattern to his dysphoria which has improved over the last 1 month  Stefanie Vieyra adamantly denies acute thoughts of suicide or self-harm  Stefanie Vieyra has no plans to harm others  There is no documented history of prior suicidal attempts but Stefanie Vieyra does admit to a suicidal gesture 10+ years ago in which he "tried to overdose on alcohol"  Outside of use of cocaine, Stefanie Vieyra vehemently denies any acute or chronic history suggestive of an underlying affective (bipolar) organization  Stefanie Vieyra denies previous episodes of elevated/expansive mood, lengthy periods without sleep, grandiosity, or intense and prolonged irritability  As mentioned previous, he does experience bouts of anger and irritability, but these are not constant and are secondary to acute psychosocial stressors and unmanaged anxiety   Stefanie Vieyra denies historical symptomatology suggestive of an underlying psychotic process       Psychopharmacologically, Marcene Carrel is tolerating Cymbalta well without adverse medication side effects  He endorses overall improvement in mood and management of anxiety  As such, no acute intervention is warranted          DSM-V Diagnoses:      1 ) Generalized Anxiety Disorder  2 ) Unspecified Depressive Disorder  3 ) Nicotine Use Disorder  4   Hx of Polysubstance Abuse (cocaine, benzodiazapine, opiates)        Treatment Recommendations/Precautions:        1 ) Generalized Anxiety Disorder  - Continue Cymbalta 30mg Daily to target anxiety, depressive symptomatology, and 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  - Not interested in weekly psychotherapy at the moment  - Psychoeducation provided regarding the importance of exercise and healthy dietary choices and their impact on mood, energy, and motivation  - Counseled to avoid ETOH, illict substances, and nicotine secondary to the detrimental effects of these substances on mental and physical health  - Encouraged to engage in non-verbal forms of therapy such as art therapy, music therapy, and mindfulness  - Discussed the bio-psycho-social model to treatment and therapeutic exercises/interventions were attempted to cognitively restructure thoughts     2 ) Unspecified Depressive Disorder  - Continue Cymbalta 30mg Daily to target anxiety, depressive symptomatology, and 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  - Not interested in weekly psychotherapy at the moment  - Psychoeducation provided regarding the importance of exercise and healthy dietary choices and their impact on mood, energy, and motivation  - Counseled to avoid ETOH, illict substances, and nicotine secondary to the detrimental effects of these substances on mental and physical health  - Encouraged to engage in non-verbal forms of therapy such as art therapy, music therapy, and mindfulness  - Discussed the bio-psycho-social model to treatment and therapeutic exercises/interventions were attempted to cognitively restructure thoughts     3 ) Nicotine Use Disorder  - Counseled to avoid ETOH, illict substances, and nicotine secondary to the detrimental effects of these substances on mental and physical health  - Reports significant reduction in use (75%) since last visit with improvement in anxiety control       4  Hx of Polysubstance Abuse (cocaine, benzodiazapine, opiates)  - Sobriety for 5+ years, attended rehab in 2010  - Counseled to avoid ETOH, illict substances, and nicotine secondary to the detrimental effects of these substances on mental and physical health          Medication management every 3 months  Aware of need to follow up with family physician for medical issues  Aware of 24 hour and weekend coverage for urgent situations accessed by calling [facility] main practice number    Medications Risks/Benefits      Risks, Benefits And Possible Side Effects Of Medications:    Risks, benefits, and possible side effects of medications explained to Tan Cadet including risk of suicidality and serotonin syndrome related to treatment with antidepressants  He verbalizes understanding and agreement for treatment  Controlled Medication Discussion:     No recent records found for controlled prescriptions according to [place] Prescription Drug Monitoring Program    Psychotherapy Provided:     Individual psychotherapy provided: Yes  Counseling was provided during the session today for 16 minutes  Medication education provided to Tan Cadet  Recent stressors discussed with Tan Cadet including drug and alcohol use problems, job stress, medical problems, chronic pain, recent medication change, social difficulties, everyday stressors and occasional anxiety    Coping strategies including abstaining from substance use, compliance with medications, exercising, increasing social contact, maintain healthy diet, maintain heathy sleeping hygiene, relaxation and stress reduction reviewed with Darrick Blanchard  Educated on importance of medication and treatment compliance  Importance of follow up with family physician for medical issues reviewed with Darrick Blanchard  Supportive therapy provided  Treatment Plan:    Completed and signed during the session: Not applicable - Treatment Plan not due at this session    Note Share Disclaimer:      This note was not shared with the patient due to reasonable likelihood of causing patient harm    Renato Fontanez MD 07/14/21

## 2021-08-25 ENCOUNTER — VBI (OUTPATIENT)
Dept: ADMINISTRATIVE | Facility: OTHER | Age: 31
End: 2021-08-25

## 2021-09-01 DIAGNOSIS — F41.1 GENERALIZED ANXIETY DISORDER: ICD-10-CM

## 2021-09-01 RX ORDER — DULOXETIN HYDROCHLORIDE 30 MG/1
30 CAPSULE, DELAYED RELEASE ORAL DAILY
Qty: 90 CAPSULE | Refills: 0 | Status: SHIPPED | OUTPATIENT
Start: 2021-09-01 | End: 2021-11-01 | Stop reason: SDUPTHER

## 2021-10-27 ENCOUNTER — TELEPHONE (OUTPATIENT)
Dept: PSYCHIATRY | Facility: CLINIC | Age: 31
End: 2021-10-27

## 2021-11-01 ENCOUNTER — TELEMEDICINE (OUTPATIENT)
Dept: PSYCHIATRY | Facility: CLINIC | Age: 31
End: 2021-11-01
Payer: COMMERCIAL

## 2021-11-01 DIAGNOSIS — F17.200 NICOTINE USE DISORDER: ICD-10-CM

## 2021-11-01 DIAGNOSIS — F32.A DEPRESSIVE DISORDER: ICD-10-CM

## 2021-11-01 DIAGNOSIS — F41.1 GENERALIZED ANXIETY DISORDER: Primary | ICD-10-CM

## 2021-11-01 DIAGNOSIS — F19.10 POLYSUBSTANCE ABUSE (HCC): ICD-10-CM

## 2021-11-01 PROCEDURE — 99214 OFFICE O/P EST MOD 30 MIN: CPT | Performed by: STUDENT IN AN ORGANIZED HEALTH CARE EDUCATION/TRAINING PROGRAM

## 2021-11-01 RX ORDER — DULOXETIN HYDROCHLORIDE 30 MG/1
30 CAPSULE, DELAYED RELEASE ORAL DAILY
Qty: 90 CAPSULE | Refills: 1 | Status: SHIPPED | OUTPATIENT
Start: 2021-11-01 | End: 2022-05-03 | Stop reason: SDUPTHER

## 2021-11-04 ENCOUNTER — VBI (OUTPATIENT)
Dept: ADMINISTRATIVE | Facility: OTHER | Age: 31
End: 2021-11-04

## 2021-12-08 ENCOUNTER — VBI (OUTPATIENT)
Dept: ADMINISTRATIVE | Facility: OTHER | Age: 31
End: 2021-12-08

## 2022-01-11 ENCOUNTER — VBI (OUTPATIENT)
Dept: ADMINISTRATIVE | Facility: OTHER | Age: 32
End: 2022-01-11

## 2022-01-20 DIAGNOSIS — F41.1 GENERALIZED ANXIETY DISORDER: ICD-10-CM

## 2022-01-20 RX ORDER — DULOXETIN HYDROCHLORIDE 30 MG/1
30 CAPSULE, DELAYED RELEASE ORAL DAILY
Qty: 90 CAPSULE | Refills: 1 | OUTPATIENT
Start: 2022-01-20

## 2022-01-28 ENCOUNTER — OFFICE VISIT (OUTPATIENT)
Dept: ENDOCRINOLOGY | Facility: CLINIC | Age: 32
End: 2022-01-28
Payer: COMMERCIAL

## 2022-01-28 VITALS
WEIGHT: 188.6 LBS | BODY MASS INDEX: 30.44 KG/M2 | HEART RATE: 93 BPM | DIASTOLIC BLOOD PRESSURE: 70 MMHG | SYSTOLIC BLOOD PRESSURE: 112 MMHG | TEMPERATURE: 97.8 F

## 2022-01-28 DIAGNOSIS — F41.1 GENERALIZED ANXIETY DISORDER: ICD-10-CM

## 2022-01-28 DIAGNOSIS — E10.649 HYPOGLYCEMIA UNAWARENESS ASSOCIATED WITH TYPE 1 DIABETES MELLITUS (HCC): ICD-10-CM

## 2022-01-28 DIAGNOSIS — E78.2 MIXED HYPERLIPIDEMIA: ICD-10-CM

## 2022-01-28 DIAGNOSIS — E10.65 TYPE 1 DIABETES MELLITUS WITH HYPERGLYCEMIA (HCC): Primary | ICD-10-CM

## 2022-01-28 DIAGNOSIS — E55.9 VITAMIN D DEFICIENCY: ICD-10-CM

## 2022-01-28 PROCEDURE — 99205 OFFICE O/P NEW HI 60 MIN: CPT | Performed by: INTERNAL MEDICINE

## 2022-01-28 RX ORDER — GLUCAGON 3 MG/1
POWDER NASAL
Qty: 1 EACH | Refills: 3 | Status: SHIPPED | OUTPATIENT
Start: 2022-01-28

## 2022-01-28 RX ORDER — PEN NEEDLE, DIABETIC 32GX 5/32"
NEEDLE, DISPOSABLE MISCELLANEOUS
Qty: 150 EACH | Refills: 3 | Status: SHIPPED | OUTPATIENT
Start: 2022-01-28

## 2022-01-28 RX ORDER — INSULIN GLARGINE 100 [IU]/ML
INJECTION, SOLUTION SUBCUTANEOUS
Qty: 15 ML | Refills: 3 | Status: SHIPPED | OUTPATIENT
Start: 2022-01-28 | End: 2022-01-31

## 2022-01-28 RX ORDER — INSULIN ASPART 100 [IU]/ML
INJECTION, SOLUTION INTRAVENOUS; SUBCUTANEOUS
Qty: 15 ML | Refills: 3 | Status: SHIPPED | OUTPATIENT
Start: 2022-01-28

## 2022-01-28 NOTE — ASSESSMENT & PLAN NOTE
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

## 2022-01-28 NOTE — ASSESSMENT & PLAN NOTE
Lab Results   Component Value Date    HGBA1C 7 6 (H) 01/20/2021     -hx of type 1 DM since 1 y/o  -prior use of insulin pump, stopped around middle school as per patient reports due to 'too expensive'  -prior follow with endocrinology, no recent follow up since about 10 years per patient  -using since Novolin R from [employer] (cheapest option) at least 3-4 times a day as per prior sliding scale that patient remembers form prior treatment  -sugars ranging from: 180's-250's in AM, hypoglycemic events also noted, lowest at 26 for which takes sugary drinks and improves  -frequently active, exercise   Not interested in re-starting insulin pump at this time    -discussed with adjust treatment as below:  -start: Lantus 22 units in AM   -start: novolog 1 unit for 10 g of carbohydrates TID with scale  -glucagon ordered for severe hypoglycemia events  -monitor glucose logs, notify if hyperglycemia/hypoglycemia events    -f/u labs: hba1c, TSH, thyroid antibodies to rule out autoimmune disease  -following with ophthalmology regularly, continue routine follow ups  -not followed with podiatry for a while, encouraged in the future as well due to diabetes history

## 2022-01-28 NOTE — PATIENT INSTRUCTIONS
INSULIN DOSAGE INSTRUCTIONS    Name: Rosalio Flores                        : 1990  MRN #: [id number]    Your Current Insulin  and dose is: Before Breakfast Before Lunch Before Evening Meal Bedtime     Novolog Insulin   1 unit for 10 grams      1 unit for 10 grams    1 unit for 10 grams     Regular, Apidra, Humalog orNovolog Sliding Scale:   <80              151-200 + 1 +1 +1    201-250 + 2 +2 +2 +   251-300 + 3 +3 +3 +   301-350 + 4 +4 +4 +   >350 + 5 +5 +5 +       Lantus Insulin   22 units         Additional Instructions:   Please test your blood sugar:  _4_ Times per day  X_ Before Breakfast                _ Alternate Testing  X_ Before Lunch                _ 2 Hours After  Meal  X_ Before Evening Meal               _ 3 a m   x_ Before Bedtime Snack     Target Blood sugar range _70_to _140__  Call if your No primary care provider on file  blood sugar is less than _60_ or greater than _400__  Today's Date: 2022        Hypoglycemia instructions   [facility]  2022  [id number]    Low Blood Sugar    Steps to treat low blood sugar  1  Test blood sugar if you have symptoms of low blood sugar:   Low Blood Sugar Symptoms:  o Sweaty  o Dizzy  o Rapid heartbeat  o Shaky    o Bad mood  o Hungry      2  Treat blood sugar less than 70 with 15 grams of fast-acting carbohydrate:   Examples of 15 grams Fast-Acting Carbohydrate:  o 4 oz juice  o 4 oz regular soda  o 3-4 glucose tablets (chew)  o 3-4 hard candies (chew)              3    Wait 15 minutes and test your blood sugar again           4   If blood sugar is less than 100, repeat steps 2-3       5  When your blood sugar is 100 or more, eat a snack if it will be longer than one hour until your next meal  The snack should be 15 grams of carbohydrate and a protein:   Examples of snacks:  o ½ sandwich  o 6 crackers with cheese  o Piece of fruit with cheese or peanut butter  o 6 crackers with peanut butter
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
## 2022-01-31 DIAGNOSIS — E10.65 TYPE 1 DIABETES MELLITUS WITH HYPERGLYCEMIA (HCC): Primary | ICD-10-CM

## 2022-01-31 RX ORDER — INSULIN DEGLUDEC INJECTION 100 U/ML
22 INJECTION, SOLUTION SUBCUTANEOUS DAILY
Qty: 15 ML | Refills: 1 | Status: SHIPPED | OUTPATIENT
Start: 2022-01-31

## 2022-02-01 ENCOUNTER — TELEPHONE (OUTPATIENT)
Dept: ENDOCRINOLOGY | Facility: CLINIC | Age: 32
End: 2022-02-01

## 2022-02-01 LAB
25(OH)D3 SERPL-MCNC: 11 NG/ML (ref 30–100)
ALBUMIN SERPL-MCNC: 3.9 G/DL (ref 3.6–5.1)
ALBUMIN/CREAT UR: NORMAL MCG/MG CREAT
ALBUMIN/GLOB SERPL: 1.4 (CALC) (ref 1–2.5)
ALP SERPL-CCNC: 97 U/L (ref 36–130)
ALT SERPL-CCNC: 17 U/L (ref 9–46)
AST SERPL-CCNC: 27 U/L (ref 10–40)
BILIRUB SERPL-MCNC: 0.5 MG/DL (ref 0.2–1.2)
BUN SERPL-MCNC: 22 MG/DL (ref 7–25)
BUN/CREAT SERPL: ABNORMAL (CALC) (ref 6–22)
CALCIUM SERPL-MCNC: 9.7 MG/DL (ref 8.6–10.3)
CHLORIDE SERPL-SCNC: 101 MMOL/L (ref 98–110)
CHOLEST SERPL-MCNC: 306 MG/DL
CHOLEST/HDLC SERPL: 6.8 (CALC)
CO2 SERPL-SCNC: 29 MMOL/L (ref 20–32)
CREAT SERPL-MCNC: 1.16 MG/DL (ref 0.6–1.35)
CREAT UR-MCNC: 95 MG/DL (ref 20–320)
GLOBULIN SER CALC-MCNC: 2.8 G/DL (CALC) (ref 1.9–3.7)
GLUCOSE SERPL-MCNC: 241 MG/DL (ref 65–99)
HBA1C MFR BLD: 8.6 % OF TOTAL HGB
HDLC SERPL-MCNC: 45 MG/DL
LDLC SERPL CALC-MCNC: 199 MG/DL (CALC)
MICROALBUMIN UR-MCNC: <0.2 MG/DL
NONHDLC SERPL-MCNC: 261 MG/DL (CALC)
POTASSIUM SERPL-SCNC: 5.1 MMOL/L (ref 3.5–5.3)
PROT SERPL-MCNC: 6.7 G/DL (ref 6.1–8.1)
SL AMB EGFR AFRICAN AMERICAN: 96 ML/MIN/1.73M2
SL AMB EGFR NON AFRICAN AMERICAN: 83 ML/MIN/1.73M2
SODIUM SERPL-SCNC: 137 MMOL/L (ref 135–146)
T4 FREE SERPL-MCNC: 1 NG/DL (ref 0.8–1.8)
THYROGLOB AB SERPL-ACNC: <1 IU/ML
THYROPEROXIDASE AB SERPL-ACNC: 9 IU/ML
TRIGL SERPL-MCNC: 367 MG/DL
TSH SERPL-ACNC: 1.44 MIU/L (ref 0.4–4.5)

## 2022-02-01 RX ORDER — MELATONIN
4000 DAILY
COMMUNITY

## 2022-02-01 RX ORDER — IBUPROFEN 600 MG/1
1 TABLET ORAL AS NEEDED
Qty: 1 KIT | Refills: 1 | Status: SHIPPED | OUTPATIENT
Start: 2022-02-01

## 2022-02-01 NOTE — TELEPHONE ENCOUNTER
----- Message from Love Ma PA-C sent at 2/1/2022  8:48 AM EST -----  Please call the patient regarding his abnormal result  Thyroid function tests are normal  Thyroid antibody is positive so will monitor thyroid function tests in the future as well as he is at risk for hypothyroidism  Cholesterol levels are very high; LDL (unhealthy cholesterol) is very high at 199  Per Dr Shital Fatima last note, he should restart his atorvastatin  Glucose elevated at 241  A1C elevated at 8 6%  he should continue with changes that Dr Adama Bernard suggested at recent appointment and send log in 1-2 weeks  Vitamin D is low at 11  Increase vitamin D3 to 4000 IU daily

## 2022-05-03 ENCOUNTER — TELEMEDICINE (OUTPATIENT)
Dept: PSYCHIATRY | Facility: CLINIC | Age: 32
End: 2022-05-03
Payer: COMMERCIAL

## 2022-05-03 DIAGNOSIS — F17.200 NICOTINE USE DISORDER: ICD-10-CM

## 2022-05-03 DIAGNOSIS — F32.A DEPRESSIVE DISORDER: ICD-10-CM

## 2022-05-03 DIAGNOSIS — F41.1 GENERALIZED ANXIETY DISORDER: Primary | ICD-10-CM

## 2022-05-03 PROCEDURE — 99213 OFFICE O/P EST LOW 20 MIN: CPT | Performed by: STUDENT IN AN ORGANIZED HEALTH CARE EDUCATION/TRAINING PROGRAM

## 2022-05-03 RX ORDER — DULOXETIN HYDROCHLORIDE 30 MG/1
30 CAPSULE, DELAYED RELEASE ORAL DAILY
Qty: 90 CAPSULE | Refills: 2 | Status: SHIPPED | OUTPATIENT
Start: 2022-05-03

## 2022-05-03 NOTE — BH TREATMENT PLAN
TREATMENT PLAN (Medication Management Only)        [facility]    Name and Date of Birth:  Cresencio Mortimer 27 y o  1990  Date of Treatment Plan: May 3, 2022  Diagnosis/Diagnoses:    1  Generalized anxiety disorder    2  Nicotine use disorder    3  Depressive disorder, unspecified       Strengths/Personal Resources for Self-Care: supportive family, supportive friends, taking medications as prescribed, ability to adapt to life changes, ability to communicate needs, ability to communicate well, ability to listen, ability to understand psychiatric illness, good understanding of illness, independence, motivation for treatment, ability to negotiate basic needs, being resoureceful, stable employment, willingness to work on problems  Area/Areas of need (in own words): anxiety symptoms  1  Long Term Goal: maintain control of anxiety  Target Date:6 months - 11/3/2022  Person/Persons responsible for completion of goal: Harmony Mackenzie  2  Short Term Objective (s) - How will we reach this goal?:   A  Provider new recommended medication/dosage changes and/or continue medication(s): continue current medications as prescribed  B  Attend medication management appointments regularly  C  Take psychiatric medications responsibly  D  Eat healthier  E  GO deep sea fishing with friends  Target Date:6 months - 11/3/2022  Person/Persons Responsible for Completion of Goal: aHrmony Mackenzie  Progress Towards Goals: stable, continuing treatment  Treatment Modality: medication management every 6 months  Review due 180 days from date of this plan: 6 months - 11/3/2022  Expected length of service: maintenance  My Physician/PA/NP and I have developed this plan together and I agree to work on the goals and objectives  I understand the treatment goals that were developed for my treatment  Treatment Plan completed with assistance and input from patient and verbal consent provided   Treatment plan was not signed at time of office visit secondary to COVID-19 social distancing guidelines

## 2022-05-03 NOTE — PSYCH
MEDICATION MANAGEMENT NOTE        [facility]      Name and Date of Birth:  Balaji Fall 27 y o  1990 MRN: [id number]    Date of Visit: May 3, 2022    Reason for Visit: Follow-up visit for medication management     SUBJECTIVE:    Balaji Fall is a 27 y o  male with past psychiatric history significant for Generalized Anxiety Disorder, Unspecified Depressive Disorder, Nicotine Use Disorder, and Hx of Polysubstance Abuse (cocaine, benzodiazapine, opiates) who was personally seen and evaluated today at the [address] [place] [address] outpatient clinic for follow-up and medication management  Louann Juarez presents as calm, pleasant, and cooperative  His thoughts are organized and linear  He completes assessment without difficulty  Louann Juarez remains adherent to Cymbalta 30mg Daily  He denies adverse medication side effects  Louann Juarez reports psychiatric stability over the last 6 months  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 long-term physical ailments  Louann Juarez denies current neurovegetative symptomatology  His sleep and appetite are adequate  His energy is appropriate  He is without SI/HI when asked  His focus, concentration, and memory are at baseline  He is without crying spells or anhedonia  Louann Juarez denies pathologic worry or anxiety  He is not tense or on-edge today  Louann Juarez denies recent panic symptomatology  He continues to minimize his use of caffeine  Louann Juarez had recent labs which revealed elevated A1c, elevated cholesterol, and Vit D deficiency  We discussed ways to implement better dietary choices into his everyday life  He was receptive  He is currently supplementing with Vit D  Louann Juarez is future-oriented, discussing plans to go deep sea fishing in July and watch baseball games this summer   He is pleased to share that he was recently hired at [employer] and finds this to be pleasurable  During today's examination, Baldev Maldonado does not exhibit objective evidence of leonid/hypomania or barney psychosis  Baldev Maldonado reports that he is no longer emotionally labile or reactive  He is without irritability or mood swings  Baldev Maldonado denies recent ETOH or illicit substance abuse, aside from cannabis use  He offers no further concerns  Current Rating Scores:     None completed today  Review Of Systems:      Constitutional negative   ENT negative   Cardiovascular negative   Respiratory negative   Gastrointestinal negative   Genitourinary negative   Musculoskeletal negative   Integumentary negative   Neurological negative   Endocrine negative   Other Symptoms none, all other systems are negative       Past Psychiatric History: (unchanged information from previous note copied and italicized) - Information that is bolded has been updated       Inpatient psychiatric admissions: Denies  Prior outpatient psychiatric linkage: Denies  Past/current psychotherapy: Received therapy while in Rehab in 2010 - no current therapy linkage  History of suicidal attempts/gestures: 1x in 2010 when he "excessively drank" and hope he didn't awake   History of violence/aggressive behaviors: Denies  Psychotropic medication trials: "stimulant for ADHD", Cymbalta   Substance abuse inpatient/outpatient rehabilitation: 1x - 2010 at [facility] in [place]     Substance Abuse History: (unchanged information from previous note copied and italicized) - Information that is bolded has been updated       Extensive history of ETOH, illict substance (cocaine, cannabis, xanax, opiates), and tobacco abuse  States that he now only uses cannabis (1-2x per week) and daily nicotine use  He has been sober from other substances for 5+ years  No past legal actions or arrests secondary to substance intoxication   The patient denies prior DWIs/DUIs  Perez does not exhibit objective evidence of substance withdrawal during today's examination nor does Perez appear under the influence of any psychoactive substance        Social History: (unchanged information from previous note copied and italicized) - Information that is bolded has been updated       Developmental: Denies a history of milestone/developmental delay  Denies a history of in-utero exposure to toxins/illicit substances  There is no documented history of IEP or need for special education  Mother and father  when patient was in 2nd grade    Education: high school diploma/GED  Marital history: single  Living arrangement, social support: friend(s), no longer working at [employer], now independently training [employer] and hired at [employer] History: Employed full time at [employer], trains individuals to fight MMA  Access to firearms: Denies direct access to weapons/firearms  Perez [facility] no history of arrests or violence with a deadly weapon       Traumatic History: (unchanged information from previous note copied and italicized) - Information that is bolded has been updated       Abuse:none is reported  Other Traumatic Events: Numerous concussions ("at least 5 maybe 10") via kickboxing and exposure to [address] [place] [address]      Past Medical History:    Past Medical History:   Diagnosis Date    Diabetes mellitus ([facility] [address] )      No past medical history pertinent negatives  No past surgical history on file    No Known Allergies    Substance Abuse History:    Social History     Substance and Sexual Activity   Alcohol Use No     Social History     Substance and Sexual Activity   Drug Use Yes    Types: Marijuana       Social History:    Social History     Socioeconomic History    Marital status: Single     Spouse name: Not on file    Number of children: Not on file    Years of education: Not on file    Highest education level: Not on file   Occupational History    Not on file   Tobacco Use    Smoking status: Former Smoker     Quit date: 2020 Years since quittin 3    Smokeless tobacco: Current User     Types: Chew   Vaping Use    Vaping Use: Never used   Substance and Sexual Activity    Alcohol use: No    Drug use: Yes     Types: Marijuana    Sexual activity: Not on file   Other Topics Concern    Not on file   Social History Narrative    Not on file     Social Determinants of Health     Financial Resource Strain: Not on file   Food Insecurity: Not on file   Transportation Needs: Not on file   Physical Activity: Not on file   Stress: Not on file   Social Connections: Not on file   Intimate Partner Violence: Not on file   Housing Stability: Not on file       Family Psychiatric History:     Family History   Problem Relation Age of Onset    Alcohol abuse Mother     Anxiety disorder Mother     Depression Mother     Anxiety disorder Brother     Depression Brother        History Review: The following portions of the patient's history were reviewed and updated as appropriate: allergies, current medications, past family history, past medical history, past social history, past surgical history and problem list          OBJECTIVE:     Vital signs in last 24 hours: There were no vitals filed for this visit      Mental Status Evaluation:    Appearance age appropriate, casually dressed, dressed appropriately, looks stated age, wearing a hat   Behavior pleasant, cooperative, calm   Speech normal rate, normal volume, normal pitch   Mood euthymic   Affect normal range and intensity, appropriate   Thought Processes organized, logical, goal directed   Associations intact associations   Thought Content no overt delusions   Perceptual Disturbances: no auditory hallucinations, no visual hallucinations   Abnormal Thoughts  Risk Potential Suicidal ideation - None at present  Homicidal ideation - None at present  Potential for aggression - No   Orientation oriented to person, place, time/date and situation   Memory recent and remote memory grossly intact Consciousness alert and awake   Attention Span Concentration Span attention span and concentration are age appropriate   Intellect appears to be of average intelligence   Insight intact and good   Judgement intact and good   Muscle Strength and  Gait unable to assess today due to virtual visit   Motor activity no abnormal movements   Language no difficulty naming common objects   Fund of Knowledge adequate knowledge of current events   Pain none   Pain Scale 0       Laboratory Results: I have personally reviewed all pertinent laboratory/tests results    Recent Labs (last 4 months):   Orders Only on 01/31/2022   Component Date Value    Total Cholesterol 01/31/2022 306*    HDL 01/31/2022 45     Triglycerides 01/31/2022 367*    LDL Calculated 01/31/2022 199*    Chol HDLC Ratio 01/31/2022 6 8*    Non-HDL Cholesterol 01/31/2022 261*    Thyroglobulin Ab 01/31/2022 <1     Thyroid Peroxidase Antib* 01/31/2022 9*    Creatinine, Urine 01/31/2022 95     Microalbum  ,U,Random 01/31/2022 <0 2     Microalb/Creat Ratio 01/31/2022 NOTE     Glucose, Random 01/31/2022 241*    BUN 01/31/2022 22     Creatinine 01/31/2022 1 16     eGFR Non  01/31/2022 83     eGFR  01/31/2022 96     SL AMB BUN/CREATININE RA* 95/07/1394 NOT APPLICABLE     Sodium 94/21/9532 137     Potassium 01/31/2022 5 1     Chloride 01/31/2022 101     CO2 01/31/2022 29     Calcium 01/31/2022 9 7     Protein, Total 01/31/2022 6 7     Albumin 01/31/2022 3 9     Globulin 01/31/2022 2 8     Albumin/Globulin Ratio 01/31/2022 1 4     TOTAL BILIRUBIN 01/31/2022 0 5     Alkaline Phosphatase 01/31/2022 97     AST 01/31/2022 27     ALT 01/31/2022 17     Free t4 01/31/2022 1 0     TSH 01/31/2022 1 44     Vitamin D, 25-Hydroxy, S* 01/31/2022 11*    Hemoglobin A1C 01/31/2022 8 6*       Suicide/Homicide Risk Assessment:    The following interventions are recommended: no intervention changes needed      Lethality Statement:    Based on today's assessment and clinical criteria, Racheal Culver contracts for safety and is not an imminent risk of harm to self or others  Outpatient level of care is deemed appropriate at this current time  Beena Eugene understands that if they can no longer contract for safety, they need to call the office or report to their nearest Emergency Room for immediate evaluation  Assessment/Plan:      Lea Chi is a 27 y  o  male with past psychiatric history significant for Generalized Anxiety Disorder, Unspecified Depressive Disorder, Nicotine Use Disorder, and Hx of Polysubstance Abuse (cocaine, benzodiazapine, opiates) who was personally seen and evaluated today at the [address] [place] [address] [address] outpatient clinic for follow-up and medication management  Perez was referred to the clinic secondary to worsening anxiety and episodic bouts of depression, that are worse in the winter months  Beena Eugene endorses a longstanding history of polysubstance abuse that likely contributed to worsening mood symptomatology 1 decade ago  Beena Eugene is pleased to report during intake that he has been sober from alcohol, "pills", and cocaine for many years  He continues to use nicotine daily and cannabis 1-2x per week  In addition to polysubstance abuse, Beena Eugene has an extensive history of head trauma secondary to years of fighting in [address] [place] [address]  He states that he has suffered 3 concussions "just this year"  This also has most likely contributed to mood symptomatology and anxiety  During intake session, Dolph Hockey his main concern is managing his anxiety  He admited to daily worry, excessive nervousness, and constantly feeling "tense" and on-edge  When overtly anxious, he endorsed profound irritability, anger outbursts, and limited frustration tolerance   During those times, he is able to use mindfulness exercises learned in MMA to decompress  At intake, he reported that those episodes were happening approximately 1-2x per week  Perez admitted to being easily annoyed and restless  He finds it difficult to relax and concentrate at times, secondary to anxious ruminations  He denies panic symptomatology or any physical manifestations of anxiety  Perez denied neurovegetative symptomatology suggestive of major depressive disorder or dysthymia during intake session  He did endorse a seasonal pattern to his dysphoria which has improved over the last 1 month  Perez adamantly denies acute thoughts of suicide or self-harm  Perez has no plans to harm others  There is no documented history of prior suicidal attempts but Karthik Willett does admit to a suicidal gesture 10+ years ago in which he "tried to overdose on alcohol"  Outside of use of cocaine, Perez vehemently denies any acute or chronic history suggestive of an underlying affective (bipolar) organization  Perez denies previous episodes of elevated/expansive mood, lengthy periods without sleep, grandiosity, or intense and prolonged irritability  As mentioned previous, he does experience bouts of anger and irritability, but these are not constant and are secondary to acute psychosocial stressors and unmanaged anxiety  Perez denies historical symptomatology suggestive of an underlying psychotic process       Today's Plan:    Psychopharmacologically, Perez report appropriate control of mood, anxiety, and anger at this juncture  He is without lethality concern  As such, no acute changes are warranted  DSM-V Diagnoses:      1 ) Generalized Anxiety Disorder  2 ) Unspecified Depressive Disorder  3 ) Nicotine Use Disorder - now in early remission   4   Hx of Polysubstance Abuse (cocaine, benzodiazapine, opiates)        Treatment Recommendations/Precautions:        1 ) Generalized Anxiety Disorder  - Continue Cymbalta 30mg Daily to target anxiety, depressive symptomatology, and 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  - Psychoeducation provided regarding the importance of exercise and healthy dietary choices and their impact on mood, energy, and motivation  - Counseled to avoid ETOH, illict substances, and nicotine secondary to the detrimental effects of these substances on mental and physical health  - Encouraged to engage in non-verbal forms of therapy such as art therapy, music therapy, and mindfulness  - Discussed the bio-psycho-social model to treatment and therapeutic exercises/interventions were attempted to cognitively restructure thoughts     2  ) Unspecified Depressive Disorder  - Continue Cymbalta 30mg Daily to target anxiety, depressive symptomatology, and 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     3  ) Nicotine Use Disorder - now in early remission  - Counseled to avoid ETOH, illict substances, and nicotine secondary to the detrimental effects of these substances on mental and physical health  - Reports abstinence of nicotine use for 3 weeks     4  Hx of Polysubstance Abuse (cocaine, benzodiazapine, opiates)  - Sobriety for 5+ years, attended rehab in 2010  - Counseled to avoid ETOH, illict substances, and nicotine secondary to the detrimental effects of these substances on mental and physical health            Medication management every 6 months  Aware of need to follow up with family physician for medical issues  Aware of 24 hour and weekend coverage for urgent situations accessed by calling [facility] main practice number    Medications Risks/Benefits      Risks, Benefits And Possible Side Effects Of Medications:    Risks, benefits, and possible side effects of medications explained to Louann Juarez including risk of suicidality and serotonin syndrome related to treatment with antidepressants  He verbalizes understanding and agreement for treatment      Controlled Medication Discussion:     No recent records found for controlled prescriptions according to [place] Prescription Drug Monitoring Program    Psychotherapy Provided: Individual psychotherapy provided: Counseling was provided during the session today for 10 minutes  Medications, treatment progress and treatment plan reviewed with Kd Michaels  Treatment Plan:    Completed and signed during the session: Yes - Treatment Plan done but not signed at time of office visit due to:  Plan reviewed by video and verbal consent given due to [place] social distancing    Note Share Disclaimer:      This note was not shared with the patient due to reasonable likelihood of causing patient harm    Joann Bojorquez MD 05/03/22

## 2022-06-20 ENCOUNTER — TELEPHONE (OUTPATIENT)
Dept: ENDOCRINOLOGY | Facility: CLINIC | Age: 32
End: 2022-06-20

## 2022-06-20 NOTE — TELEPHONE ENCOUNTER
Patient came in today with a new insurance [employer]  We do not accept insurance   Offered to make him self pay for today to be seen and declined  appt was cx'd

## 2022-10-31 ENCOUNTER — TELEPHONE (OUTPATIENT)
Dept: OTHER | Facility: OTHER | Age: 32
End: 2022-10-31

## 2022-10-31 NOTE — TELEPHONE ENCOUNTER
Pt would like to let the office know that he no longer lives in the state and will not be attending any future appointments  Please give him a call with any questions